# Patient Record
Sex: FEMALE | Race: WHITE | NOT HISPANIC OR LATINO
[De-identification: names, ages, dates, MRNs, and addresses within clinical notes are randomized per-mention and may not be internally consistent; named-entity substitution may affect disease eponyms.]

---

## 2017-02-28 ENCOUNTER — RX RENEWAL (OUTPATIENT)
Age: 37
End: 2017-02-28

## 2017-10-11 ENCOUNTER — LABORATORY RESULT (OUTPATIENT)
Age: 37
End: 2017-10-11

## 2017-10-11 ENCOUNTER — APPOINTMENT (OUTPATIENT)
Dept: INTERNAL MEDICINE | Facility: CLINIC | Age: 37
End: 2017-10-11
Payer: COMMERCIAL

## 2017-10-11 ENCOUNTER — NON-APPOINTMENT (OUTPATIENT)
Age: 37
End: 2017-10-11

## 2017-10-11 VITALS
TEMPERATURE: 98.2 F | RESPIRATION RATE: 18 BRPM | SYSTOLIC BLOOD PRESSURE: 130 MMHG | OXYGEN SATURATION: 99 % | DIASTOLIC BLOOD PRESSURE: 90 MMHG | BODY MASS INDEX: 29.19 KG/M2 | HEIGHT: 67 IN | HEART RATE: 88 BPM | WEIGHT: 186 LBS

## 2017-10-11 DIAGNOSIS — Z86.018 PERSONAL HISTORY OF OTHER BENIGN NEOPLASM: ICD-10-CM

## 2017-10-11 DIAGNOSIS — E83.52 HYPERCALCEMIA: ICD-10-CM

## 2017-10-11 DIAGNOSIS — D68.9 COAGULATION DEFECT, UNSPECIFIED: ICD-10-CM

## 2017-10-11 PROCEDURE — 93000 ELECTROCARDIOGRAM COMPLETE: CPT

## 2017-10-11 PROCEDURE — 99204 OFFICE O/P NEW MOD 45 MIN: CPT | Mod: 25

## 2017-10-12 PROBLEM — E83.52 HYPERCALCEMIA: Status: ACTIVE | Noted: 2017-10-12

## 2017-10-12 LAB
25(OH)D3 SERPL-MCNC: 20.5 NG/ML
ALBUMIN SERPL ELPH-MCNC: 4.3 G/DL
ALP BLD-CCNC: 64 U/L
ALT SERPL-CCNC: 53 U/L
ANION GAP SERPL CALC-SCNC: 14 MMOL/L
APPEARANCE: CLEAR
AST SERPL-CCNC: 42 U/L
BASOPHILS # BLD AUTO: 0.01 K/UL
BASOPHILS NFR BLD AUTO: 0.2 %
BILIRUB SERPL-MCNC: 0.2 MG/DL
BILIRUBIN URINE: NEGATIVE
BLOOD URINE: NEGATIVE
BUN SERPL-MCNC: 13 MG/DL
CALCIUM SERPL-MCNC: 10.9 MG/DL
CCP AB SER IA-ACNC: <8 UNITS
CHLORIDE SERPL-SCNC: 100 MMOL/L
CHOLEST SERPL-MCNC: 157 MG/DL
CHOLEST/HDLC SERPL: 2.8 RATIO
CO2 SERPL-SCNC: 23 MMOL/L
COLOR: YELLOW
CREAT SERPL-MCNC: 0.78 MG/DL
EOSINOPHIL # BLD AUTO: 0.05 K/UL
EOSINOPHIL NFR BLD AUTO: 0.9 %
GGT SERPL-CCNC: 32 U/L
GLUCOSE QUALITATIVE U: 1000 MG/DL
GLUCOSE SERPL-MCNC: 242 MG/DL
HBA1C MFR BLD HPLC: 8.6 %
HCG SERPL-MCNC: <1 MIU/ML
HCT VFR BLD CALC: 36.6 %
HDLC SERPL-MCNC: 56 MG/DL
HGB BLD-MCNC: 11 G/DL
IMM GRANULOCYTES NFR BLD AUTO: 0 %
INSULIN SERPL-MCNC: 18 UU/ML
KETONES URINE: NEGATIVE
LDLC SERPL CALC-MCNC: 68 MG/DL
LEUKOCYTE ESTERASE URINE: NEGATIVE
LYMPHOCYTES # BLD AUTO: 2.16 K/UL
LYMPHOCYTES NFR BLD AUTO: 38.3 %
MAN DIFF?: NORMAL
MCHC RBC-ENTMCNC: 24.1 PG
MCHC RBC-ENTMCNC: 30.1 GM/DL
MCV RBC AUTO: 80.3 FL
MONOCYTES # BLD AUTO: 0.38 K/UL
MONOCYTES NFR BLD AUTO: 6.7 %
NEUTROPHILS # BLD AUTO: 3.04 K/UL
NEUTROPHILS NFR BLD AUTO: 53.9 %
NITRITE URINE: NEGATIVE
PH URINE: 5
PLATELET # BLD AUTO: 560 K/UL
POTASSIUM SERPL-SCNC: 5 MMOL/L
PROT SERPL-MCNC: 8 G/DL
PROTEIN URINE: ABNORMAL MG/DL
RBC # BLD: 4.56 M/UL
RBC # FLD: 20 %
RF+CCP IGG SER-IMP: NEGATIVE
SODIUM SERPL-SCNC: 137 MMOL/L
SPECIFIC GRAVITY URINE: 1.03
TRIGL SERPL-MCNC: 163 MG/DL
TSH SERPL-ACNC: 1.84 UIU/ML
UROBILINOGEN URINE: NEGATIVE MG/DL
WBC # FLD AUTO: 5.64 K/UL

## 2017-10-17 LAB — GAD65 AB SER-MCNC: 0.02 NMOL/L

## 2018-03-26 ENCOUNTER — EMERGENCY (EMERGENCY)
Facility: HOSPITAL | Age: 38
LOS: 0 days | Discharge: HOME | End: 2018-03-26
Attending: EMERGENCY MEDICINE
Payer: MEDICARE

## 2018-03-26 VITALS
SYSTOLIC BLOOD PRESSURE: 128 MMHG | HEART RATE: 90 BPM | OXYGEN SATURATION: 99 % | DIASTOLIC BLOOD PRESSURE: 76 MMHG | TEMPERATURE: 97 F | RESPIRATION RATE: 18 BRPM

## 2018-03-26 VITALS
HEIGHT: 64 IN | DIASTOLIC BLOOD PRESSURE: 91 MMHG | HEART RATE: 102 BPM | WEIGHT: 186.07 LBS | SYSTOLIC BLOOD PRESSURE: 143 MMHG | OXYGEN SATURATION: 98 % | TEMPERATURE: 98 F | RESPIRATION RATE: 16 BRPM

## 2018-03-26 DIAGNOSIS — Z79.4 LONG TERM (CURRENT) USE OF INSULIN: ICD-10-CM

## 2018-03-26 DIAGNOSIS — D68.59 OTHER PRIMARY THROMBOPHILIA: ICD-10-CM

## 2018-03-26 DIAGNOSIS — M79.662 PAIN IN LEFT LOWER LEG: ICD-10-CM

## 2018-03-26 DIAGNOSIS — E10.9 TYPE 1 DIABETES MELLITUS WITHOUT COMPLICATIONS: ICD-10-CM

## 2018-03-26 DIAGNOSIS — M54.2 CERVICALGIA: ICD-10-CM

## 2018-03-26 PROCEDURE — 93971 EXTREMITY STUDY: CPT | Mod: 26,LT

## 2018-03-26 PROCEDURE — 93971 EXTREMITY STUDY: CPT | Mod: 26,59,LT

## 2018-03-26 PROCEDURE — 99284 EMERGENCY DEPT VISIT MOD MDM: CPT

## 2018-03-26 RX ORDER — IBUPROFEN 200 MG
600 TABLET ORAL ONCE
Qty: 0 | Refills: 0 | Status: COMPLETED | OUTPATIENT
Start: 2018-03-26 | End: 2018-03-26

## 2018-03-26 RX ADMIN — Medication 600 MILLIGRAM(S): at 13:58

## 2018-03-26 RX ADMIN — Medication 600 MILLIGRAM(S): at 13:32

## 2018-03-26 NOTE — ED PROVIDER NOTE - PHYSICAL EXAMINATION
Gen: No acute distress, non toxic  HEENT: Mucous membranes moist, pink conjunctivae, EOMI. no facial swelling  Neck: can range, mild ttp over left lateral neck, near external jugular, no erythema/warmth/swelling.   CV: RRR, nl s1/s2.  Resp: CTAB, normal rate and effort  GI: Abdomen soft, NT, ND. No rebound, no guarding  : No CVAT  Neuro: A&O x 3, moving all 4 extremities  MSK: No spine or joint tenderness to palpation. no swelling b/l LE. mild ttp proximal left lower calf/upper ankle. no overlying skin changes.   Skin: No rashes. intact and perfused.

## 2018-03-26 NOTE — ED PROVIDER NOTE - OBJECTIVE STATEMENT
36 y/o female hx IDDM, protein c/s deficiency with hx of thrombophlebitis but no dvt in past c/o 2 days of pain in left neck over "vein" and pain in LLE just proximal to ankle, both atraumatic. Taking aspirin past two days with minimal improvement. Pt concerned for clot given it hurts near vein. No overlying skin changes, no fever, headache, neuro symptoms, swelling in arm/face, cp, sob, abd pain, ocps, or smoking.     ROS: No fever/chills. No eye pain/changes in vision, No ear pain/sore throat/dysphagia, No chest pain/palpitations. No SOB/cough/. No abdominal pain, N/V/D, no black/bloody bm. No dysuria/frequency/discharge, No headache. No Dizziness.    No rashes or breaks in skin. No numbness/tingling/weakness. 38 y/o female hx IDDM, protein c/s deficiency with hx of thrombophlebitis but no dvt in past c/o 2 days of pain in left neck over "vein" and pain in LLE just proximal to ankle, both atraumatic. Taking aspirin past two days with minimal improvement. Pt concerned for clot given it hurts near vein. No overlying skin changes, no fever, headache, neuro symptoms, swelling in arm/face, cp, sob, abd pain, ocps, or smoking. States not pregnant    ROS: No fever/chills. No eye pain/changes in vision, No ear pain/sore throat/dysphagia, No chest pain/palpitations. No SOB/cough/. No abdominal pain, N/V/D, no black/bloody bm. No dysuria/frequency/discharge, No headache. No Dizziness.    No rashes or breaks in skin. No numbness/tingling/weakness.

## 2018-03-26 NOTE — ED PROVIDER NOTE - PROGRESS NOTE DETAILS
[place]: results given and explained to pt including lymph node, indeterminant cause, possible reactive but will f/u with pcp. warm compresses, no erythema/warmth over superficial veins but slightly hard/distension of external jugular, so will recommend warm compress which will help any thrombophlebitis and also works for muscle strain.

## 2018-03-26 NOTE — ED ADULT NURSE NOTE - OBJECTIVE STATEMENT
Patient states for a few days now she has had a bulge forming on the left side of her neck, states that she has pain when she moves her neck, especially to the left side. States that her left ankle is also hurting, unsure if that is related. Patient is Diabetic, has history of uterine fibroids, Clotting disorder, patient states "I cannot remember which one but it is either C or S protein deficiency". Denies trauma to the area, thinks she may have slept poorly on the area. Takes aspirin for pain.

## 2018-12-19 ENCOUNTER — MEDICATION RENEWAL (OUTPATIENT)
Age: 38
End: 2018-12-19

## 2018-12-20 PROBLEM — D68.9 COAGULATION DEFECT, UNSPECIFIED: Chronic | Status: ACTIVE | Noted: 2018-03-26

## 2018-12-20 PROBLEM — D25.9 LEIOMYOMA OF UTERUS, UNSPECIFIED: Chronic | Status: ACTIVE | Noted: 2018-03-26

## 2019-01-31 ENCOUNTER — LABORATORY RESULT (OUTPATIENT)
Age: 39
End: 2019-01-31

## 2019-01-31 ENCOUNTER — NON-APPOINTMENT (OUTPATIENT)
Age: 39
End: 2019-01-31

## 2019-01-31 ENCOUNTER — APPOINTMENT (OUTPATIENT)
Dept: PULMONOLOGY | Facility: CLINIC | Age: 39
End: 2019-01-31
Payer: COMMERCIAL

## 2019-01-31 VITALS
TEMPERATURE: 98.5 F | OXYGEN SATURATION: 97 % | DIASTOLIC BLOOD PRESSURE: 90 MMHG | HEART RATE: 92 BPM | BODY MASS INDEX: 28.25 KG/M2 | HEIGHT: 67 IN | SYSTOLIC BLOOD PRESSURE: 130 MMHG | WEIGHT: 180 LBS

## 2019-01-31 DIAGNOSIS — Z01.818 ENCOUNTER FOR OTHER PREPROCEDURAL EXAMINATION: ICD-10-CM

## 2019-01-31 PROCEDURE — 93000 ELECTROCARDIOGRAM COMPLETE: CPT

## 2019-01-31 PROCEDURE — 99204 OFFICE O/P NEW MOD 45 MIN: CPT | Mod: 25

## 2019-01-31 PROCEDURE — 36415 COLL VENOUS BLD VENIPUNCTURE: CPT

## 2019-02-03 NOTE — ASSESSMENT
[Patient Optimized for Surgery] : Patient optimized for surgery [No Further Testing Recommended] : no further testing recommended [ECG] : ECG [FreeTextEntry4] : No contraindication for surgery. The medical condition is optimized for surgery. \par \par DARIA CORTEZ is to be extubated once fully awake and able to protect airway. she is to be monitored in the recovery room.  Avoid oversedation. DVT prophylaxis is recommended.\par \par  I instructed the patient to notify me if there is any change in the clinical status prior the surgery including any skin manifestations. No aspirin or NSAIDs, Naproxen, FRAUSTO inhibitors, herbs, green tea and vitamins prior to surgery. NPO after midnight prior to surg. \par \par - EKG NSR\par - Follow on lab work\par \par HTN\par \par BP controlled.  Pt currently off antihypertensives \par \par DM II\par \par Hold metformin night prior and morning of surg.  Lantus 1/2 dosage the night prior and hold insulin the morning of surg due to NPO status\par \par Iron deficiency anemia\par \par Continue iron supplements.  follow on CBC \par

## 2019-02-03 NOTE — RESULTS/DATA
[] : results reviewed [ECG Reviewed] : reviewed [Normal] : The 12 - lead ECG is normal [NSR] : normal sinus rhythm [de-identified] : pending [de-identified] : pending [de-identified] : pending

## 2019-02-04 ENCOUNTER — TRANSCRIPTION ENCOUNTER (OUTPATIENT)
Age: 39
End: 2019-02-04

## 2019-02-04 ENCOUNTER — RESULT REVIEW (OUTPATIENT)
Age: 39
End: 2019-02-04

## 2019-02-04 LAB
ABO + RH PNL BLD: NORMAL
ALBUMIN SERPL ELPH-MCNC: 4.9 G/DL
ALP BLD-CCNC: 49 U/L
ALT SERPL-CCNC: 20 U/L
ANION GAP SERPL CALC-SCNC: 13 MMOL/L
APPEARANCE: CLEAR
APTT BLD: 31 SEC
AST SERPL-CCNC: 28 U/L
BASOPHILS # BLD AUTO: 0.02 K/UL
BASOPHILS NFR BLD AUTO: 0.5 %
BILIRUB SERPL-MCNC: 0.2 MG/DL
BILIRUBIN URINE: NEGATIVE
BLOOD URINE: ABNORMAL
BUN SERPL-MCNC: 12 MG/DL
CALCIUM SERPL-MCNC: 10.7 MG/DL
CANCER AG125 SERPL-ACNC: 78 U/ML
CHLORIDE SERPL-SCNC: 105 MMOL/L
CO2 SERPL-SCNC: 22 MMOL/L
COLOR: YELLOW
CREAT SERPL-MCNC: 0.75 MG/DL
EOSINOPHIL # BLD AUTO: 0.07 K/UL
EOSINOPHIL NFR BLD AUTO: 1.6 %
GLUCOSE QUALITATIVE U: NEGATIVE MG/DL
GLUCOSE SERPL-MCNC: 97 MG/DL
HBA1C MFR BLD HPLC: 6 %
HCG SERPL-MCNC: <1 MIU/ML
HCT VFR BLD CALC: 41.5 %
HGB BLD-MCNC: 13.1 G/DL
IMM GRANULOCYTES NFR BLD AUTO: 0.2 %
INR PPP: 1.04 RATIO
KETONES URINE: NEGATIVE
LEUKOCYTE ESTERASE URINE: NEGATIVE
LYMPHOCYTES # BLD AUTO: 1.65 K/UL
LYMPHOCYTES NFR BLD AUTO: 37.2 %
MAN DIFF?: NORMAL
MCHC RBC-ENTMCNC: 29.6 PG
MCHC RBC-ENTMCNC: 31.6 GM/DL
MCV RBC AUTO: 93.7 FL
MONOCYTES # BLD AUTO: 0.31 K/UL
MONOCYTES NFR BLD AUTO: 7 %
NEUTROPHILS # BLD AUTO: 2.38 K/UL
NEUTROPHILS NFR BLD AUTO: 53.5 %
NITRITE URINE: NEGATIVE
PH URINE: 5
PLATELET # BLD AUTO: 352 K/UL
POTASSIUM SERPL-SCNC: 4.6 MMOL/L
PROT SERPL-MCNC: 8 G/DL
PROTEIN URINE: NEGATIVE MG/DL
PT BLD: 11.9 SEC
RBC # BLD: 4.43 M/UL
RBC # FLD: 18.6 %
SODIUM SERPL-SCNC: 140 MMOL/L
SPECIFIC GRAVITY URINE: 1.01
UROBILINOGEN URINE: NEGATIVE MG/DL
WBC # FLD AUTO: 4.44 K/UL

## 2019-02-11 ENCOUNTER — TRANSCRIPTION ENCOUNTER (OUTPATIENT)
Age: 39
End: 2019-02-11

## 2019-02-11 LAB — ANTI-MUELLERIAN HORMONE: 1.97 NG/ML

## 2019-02-12 ENCOUNTER — TRANSCRIPTION ENCOUNTER (OUTPATIENT)
Age: 39
End: 2019-02-12

## 2019-02-20 VITALS
OXYGEN SATURATION: 100 % | WEIGHT: 179.02 LBS | DIASTOLIC BLOOD PRESSURE: 83 MMHG | HEART RATE: 100 BPM | SYSTOLIC BLOOD PRESSURE: 128 MMHG | TEMPERATURE: 98 F | HEIGHT: 64 IN | RESPIRATION RATE: 18 BRPM

## 2019-02-20 NOTE — ASU PATIENT PROFILE, ADULT - PMH
Anemia    Clotting disorder    Diabetes  type 2  Fibroids    HTN (hypertension)  off lisinopril  Hypercalcemia Anemia    Clotting disorder    Diabetes  type 2  Fibroids    Hypercalcemia

## 2019-02-20 NOTE — ASU PREOP CHECKLIST - SELECT TESTS ORDERED
BMP/PT/PTT/INR/EKG/Urinalysis/CBC Urinalysis/PT/PTT/EKG/ucg/CBC/INR/BMP BMP/Urinalysis/EKG/CBC/ucg negative/PT/PTT/INR

## 2019-02-21 ENCOUNTER — INPATIENT (INPATIENT)
Facility: HOSPITAL | Age: 39
LOS: 1 days | Discharge: HOME | DRG: 743 | End: 2019-02-23
Attending: OBSTETRICS & GYNECOLOGY | Admitting: OBSTETRICS & GYNECOLOGY
Payer: COMMERCIAL

## 2019-02-21 ENCOUNTER — RESULT REVIEW (OUTPATIENT)
Age: 39
End: 2019-02-21

## 2019-02-21 LAB
GLUCOSE BLDC GLUCOMTR-MCNC: 129 MG/DL — HIGH (ref 70–99)
GLUCOSE BLDC GLUCOMTR-MCNC: 153 MG/DL — HIGH (ref 70–99)
GLUCOSE BLDC GLUCOMTR-MCNC: 176 MG/DL — HIGH (ref 70–99)
GLUCOSE BLDC GLUCOMTR-MCNC: 192 MG/DL — HIGH (ref 70–99)

## 2019-02-21 RX ORDER — INSULIN GLARGINE 100 [IU]/ML
15 INJECTION, SOLUTION SUBCUTANEOUS AT BEDTIME
Qty: 0 | Refills: 0 | Status: DISCONTINUED | OUTPATIENT
Start: 2019-02-21 | End: 2019-02-23

## 2019-02-21 RX ORDER — METOCLOPRAMIDE HCL 10 MG
10 TABLET ORAL EVERY 6 HOURS
Qty: 0 | Refills: 0 | Status: DISCONTINUED | OUTPATIENT
Start: 2019-02-21 | End: 2019-02-23

## 2019-02-21 RX ORDER — ACETAMINOPHEN 500 MG
975 TABLET ORAL EVERY 8 HOURS
Qty: 0 | Refills: 0 | Status: DISCONTINUED | OUTPATIENT
Start: 2019-02-21 | End: 2019-02-23

## 2019-02-21 RX ORDER — SIMETHICONE 80 MG/1
80 TABLET, CHEWABLE ORAL EVERY 8 HOURS
Qty: 0 | Refills: 0 | Status: DISCONTINUED | OUTPATIENT
Start: 2019-02-21 | End: 2019-02-23

## 2019-02-21 RX ORDER — GLUCAGON INJECTION, SOLUTION 0.5 MG/.1ML
1 INJECTION, SOLUTION SUBCUTANEOUS ONCE
Qty: 0 | Refills: 0 | Status: DISCONTINUED | OUTPATIENT
Start: 2019-02-21 | End: 2019-02-23

## 2019-02-21 RX ORDER — OXYCODONE HYDROCHLORIDE 5 MG/1
5 TABLET ORAL EVERY 6 HOURS
Qty: 0 | Refills: 0 | Status: DISCONTINUED | OUTPATIENT
Start: 2019-02-21 | End: 2019-02-23

## 2019-02-21 RX ORDER — HYDROMORPHONE HYDROCHLORIDE 2 MG/ML
0.5 INJECTION INTRAMUSCULAR; INTRAVENOUS; SUBCUTANEOUS
Qty: 0 | Refills: 0 | Status: DISCONTINUED | OUTPATIENT
Start: 2019-02-21 | End: 2019-02-21

## 2019-02-21 RX ORDER — ONDANSETRON 8 MG/1
4 TABLET, FILM COATED ORAL EVERY 6 HOURS
Qty: 0 | Refills: 0 | Status: DISCONTINUED | OUTPATIENT
Start: 2019-02-21 | End: 2019-02-23

## 2019-02-21 RX ORDER — SODIUM CHLORIDE 9 MG/ML
1000 INJECTION, SOLUTION INTRAVENOUS
Qty: 0 | Refills: 0 | Status: DISCONTINUED | OUTPATIENT
Start: 2019-02-21 | End: 2019-02-23

## 2019-02-21 RX ORDER — DEXTROSE 50 % IN WATER 50 %
15 SYRINGE (ML) INTRAVENOUS ONCE
Qty: 0 | Refills: 0 | Status: DISCONTINUED | OUTPATIENT
Start: 2019-02-21 | End: 2019-02-23

## 2019-02-21 RX ORDER — DEXTROSE 50 % IN WATER 50 %
12.5 SYRINGE (ML) INTRAVENOUS ONCE
Qty: 0 | Refills: 0 | Status: DISCONTINUED | OUTPATIENT
Start: 2019-02-21 | End: 2019-02-23

## 2019-02-21 RX ORDER — INSULIN LISPRO 100/ML
VIAL (ML) SUBCUTANEOUS
Qty: 0 | Refills: 0 | Status: DISCONTINUED | OUTPATIENT
Start: 2019-02-21 | End: 2019-02-23

## 2019-02-21 RX ORDER — DOCUSATE SODIUM 100 MG
100 CAPSULE ORAL THREE TIMES A DAY
Qty: 0 | Refills: 0 | Status: DISCONTINUED | OUTPATIENT
Start: 2019-02-21 | End: 2019-02-23

## 2019-02-21 RX ORDER — DEXTROSE 50 % IN WATER 50 %
25 SYRINGE (ML) INTRAVENOUS ONCE
Qty: 0 | Refills: 0 | Status: DISCONTINUED | OUTPATIENT
Start: 2019-02-21 | End: 2019-02-23

## 2019-02-21 RX ORDER — BUPIVACAINE 13.3 MG/ML
20 INJECTION, SUSPENSION, LIPOSOMAL INFILTRATION ONCE
Qty: 0 | Refills: 0 | Status: DISCONTINUED | OUTPATIENT
Start: 2019-02-21 | End: 2019-02-23

## 2019-02-21 RX ORDER — KETOROLAC TROMETHAMINE 30 MG/ML
30 SYRINGE (ML) INJECTION EVERY 6 HOURS
Qty: 0 | Refills: 0 | Status: DISCONTINUED | OUTPATIENT
Start: 2019-02-21 | End: 2019-02-22

## 2019-02-21 RX ORDER — HYDROMORPHONE HYDROCHLORIDE 2 MG/ML
0.2 INJECTION INTRAMUSCULAR; INTRAVENOUS; SUBCUTANEOUS EVERY 4 HOURS
Qty: 0 | Refills: 0 | Status: DISCONTINUED | OUTPATIENT
Start: 2019-02-21 | End: 2019-02-22

## 2019-02-21 RX ORDER — SODIUM CHLORIDE 9 MG/ML
1000 INJECTION, SOLUTION INTRAVENOUS
Qty: 0 | Refills: 0 | Status: DISCONTINUED | OUTPATIENT
Start: 2019-02-21 | End: 2019-02-22

## 2019-02-21 RX ORDER — OXYCODONE HYDROCHLORIDE 5 MG/1
10 TABLET ORAL EVERY 6 HOURS
Qty: 0 | Refills: 0 | Status: DISCONTINUED | OUTPATIENT
Start: 2019-02-21 | End: 2019-02-23

## 2019-02-21 RX ORDER — HEPARIN SODIUM 5000 [USP'U]/ML
5000 INJECTION INTRAVENOUS; SUBCUTANEOUS EVERY 12 HOURS
Qty: 0 | Refills: 0 | Status: DISCONTINUED | OUTPATIENT
Start: 2019-02-21 | End: 2019-02-23

## 2019-02-21 RX ADMIN — INSULIN GLARGINE 15 UNIT(S): 100 INJECTION, SOLUTION SUBCUTANEOUS at 22:10

## 2019-02-21 RX ADMIN — Medication 30 MILLIGRAM(S): at 17:45

## 2019-02-21 RX ADMIN — HYDROMORPHONE HYDROCHLORIDE 0.5 MILLIGRAM(S): 2 INJECTION INTRAMUSCULAR; INTRAVENOUS; SUBCUTANEOUS at 10:43

## 2019-02-21 RX ADMIN — HEPARIN SODIUM 5000 UNIT(S): 5000 INJECTION INTRAVENOUS; SUBCUTANEOUS at 22:01

## 2019-02-21 RX ADMIN — Medication 30 MILLIGRAM(S): at 22:00

## 2019-02-21 RX ADMIN — OXYCODONE HYDROCHLORIDE 5 MILLIGRAM(S): 5 TABLET ORAL at 13:13

## 2019-02-21 RX ADMIN — Medication 2: at 11:07

## 2019-02-21 RX ADMIN — SIMETHICONE 80 MILLIGRAM(S): 80 TABLET, CHEWABLE ORAL at 22:00

## 2019-02-21 RX ADMIN — Medication 2: at 16:58

## 2019-02-21 RX ADMIN — Medication 30 MILLIGRAM(S): at 23:00

## 2019-02-21 RX ADMIN — Medication 975 MILLIGRAM(S): at 16:58

## 2019-02-21 RX ADMIN — OXYCODONE HYDROCHLORIDE 5 MILLIGRAM(S): 5 TABLET ORAL at 13:39

## 2019-02-21 RX ADMIN — HYDROMORPHONE HYDROCHLORIDE 0.5 MILLIGRAM(S): 2 INJECTION INTRAMUSCULAR; INTRAVENOUS; SUBCUTANEOUS at 10:58

## 2019-02-21 RX ADMIN — Medication 975 MILLIGRAM(S): at 17:45

## 2019-02-21 RX ADMIN — HYDROMORPHONE HYDROCHLORIDE 0.5 MILLIGRAM(S): 2 INJECTION INTRAMUSCULAR; INTRAVENOUS; SUBCUTANEOUS at 11:34

## 2019-02-21 RX ADMIN — SIMETHICONE 80 MILLIGRAM(S): 80 TABLET, CHEWABLE ORAL at 14:11

## 2019-02-21 RX ADMIN — Medication 30 MILLIGRAM(S): at 16:58

## 2019-02-21 RX ADMIN — Medication 2: at 22:09

## 2019-02-21 RX ADMIN — HYDROMORPHONE HYDROCHLORIDE 0.5 MILLIGRAM(S): 2 INJECTION INTRAMUSCULAR; INTRAVENOUS; SUBCUTANEOUS at 12:30

## 2019-02-21 NOTE — BRIEF OPERATIVE NOTE - PROCEDURE
<<-----Click on this checkbox to enter Procedure Bilateral salpingectomy by abdominal approach  02/21/2019    Active  HGOLD2  Supracervical hysterectomy  02/21/2019    Active  HGOLD2

## 2019-02-21 NOTE — PROGRESS NOTE ADULT - SUBJECTIVE AND OBJECTIVE BOX
Patient seen at bedside for post op check. Pain is well controlled. Patient is voiding and tolerating clears. She is not yet ambulating and is wearing  SCDs. Has not yet passed flatus. Tolerating clears. Denies headache, dizziness, nausea/vomiting, shortness of breath, palpitations, heavy bleeding.     T(C): 36.4 (02-21-19 @ 10:29), Max: 36.4 (02-21-19 @ 10:29)  HR: 102 (02-21-19 @ 14:29) (90 - 102)  BP: 154/89 (02-21-19 @ 14:29) (120/69 - 154/89)  RR: 14 (02-21-19 @ 14:29) (14 - 24)  SpO2: 99% (02-21-19 @ 14:29) (98% - 100%)    Gen: NAD, AO x3  Chest: normal work of breathing  Abdomen: soft, nontender, nondistended, no rebound or guarding  Incision: dressing clean, dry, intact  : no bleeding  Extremities: SCDs on      02-21-19 @ 07:01  -  02-21-19 @ 14:46  --------------------------------------------------------  IN: 4175 mL / OUT: 150 mL / NET: 4025 mL

## 2019-02-21 NOTE — H&P ADULT - ASSESSMENT
37 yo G0 here for open SHI, BS for fibroid uterus.  -2 u prbc on hold  -npo  -to pacu after  -ISS, diabetic diet post op  -analgesia prn

## 2019-02-21 NOTE — PROGRESS NOTE ADULT - ASSESSMENT
38y Female POD#0 s/p open SHI, BS for enlarged fibroid uterus.    1. Neuro/Pain:  Toradol and Tylenol ATC, oxycodone prn, dilaudid prn BT  2  CV:   VS per routine  3. Pulm: Encourage ISS  4. GI: Diabetic clears  5. : Voiding  6. Heme: AM CBC  7. Endo: DMII, Lantus 15 qHS, ISS  8. DVT ppx: SCDs, SQH  9. Dispo: Likely POD#1 or 2

## 2019-02-21 NOTE — H&P ADULT - NSHPPHYSICALEXAM_GEN_ALL_CORE
Vital Signs Last 24 Hrs  T(C): 36.5 (21 Feb 2019 15:37), Max: 36.5 (21 Feb 2019 15:37)  T(F): 97.7 (21 Feb 2019 15:37), Max: 97.7 (21 Feb 2019 15:37)  HR: 96 (21 Feb 2019 16:21) (90 - 102)  BP: 156/88 (21 Feb 2019 16:21) (120/69 - 156/88)  BP(mean): 111 (21 Feb 2019 14:29) (88 - 112)  RR: 16 (21 Feb 2019 16:21) (14 - 24)  SpO2: 100% (21 Feb 2019 16:21) (98% - 100%)    Gen NAD, AOx3  Chest normal work of breathing  Abdomen: appears gravid  Extremities: no edema, nontender

## 2019-02-21 NOTE — H&P ADULT - HISTORY OF PRESENT ILLNESS
39 yo G0 presents for scheduled hysterectomy for 40 wk sized fibroid uterus. Patient reports pelvic pain and discomfort w/ alternating diarrhea and constipation, as well as stress urinary incontinence. Not sexually active. Regular menses, heavy bleeding w/ anemia 2/2 menorrhagia.   Gynhx: fibroids  PMH: DMII well controlled, anemia  PSH: denies  Meds: Lantus 15-20 qHS, Lispro 6-8 u prn w/ meals, Metformin 1000 mg qd  NKDA  Social: tobacco, alcohol, drugs

## 2019-02-22 LAB
EXTRA GREEN TOP TUBE: SIGNIFICANT CHANGE UP
GLUCOSE BLDC GLUCOMTR-MCNC: 110 MG/DL — HIGH (ref 70–99)
GLUCOSE BLDC GLUCOMTR-MCNC: 111 MG/DL — HIGH (ref 70–99)
GLUCOSE BLDC GLUCOMTR-MCNC: 87 MG/DL — SIGNIFICANT CHANGE UP (ref 70–99)
GLUCOSE BLDC GLUCOMTR-MCNC: 96 MG/DL — SIGNIFICANT CHANGE UP (ref 70–99)
HCT VFR BLD CALC: 35.4 % — SIGNIFICANT CHANGE UP (ref 34.5–45)
HGB BLD-MCNC: 11.8 G/DL — SIGNIFICANT CHANGE UP (ref 11.5–15.5)
MCHC RBC-ENTMCNC: 31.6 PG — SIGNIFICANT CHANGE UP (ref 27–34)
MCHC RBC-ENTMCNC: 33.3 GM/DL — SIGNIFICANT CHANGE UP (ref 32–36)
MCV RBC AUTO: 94.7 FL — SIGNIFICANT CHANGE UP (ref 80–100)
NRBC # BLD: 0 /100 WBCS — SIGNIFICANT CHANGE UP (ref 0–0)
PLATELET # BLD AUTO: 294 K/UL — SIGNIFICANT CHANGE UP (ref 150–400)
RBC # BLD: 3.74 M/UL — LOW (ref 3.8–5.2)
RBC # FLD: 15.8 % — HIGH (ref 10.3–14.5)
WBC # BLD: 11.69 K/UL — HIGH (ref 3.8–10.5)
WBC # FLD AUTO: 11.69 K/UL — HIGH (ref 3.8–10.5)

## 2019-02-22 RX ORDER — IBUPROFEN 200 MG
600 TABLET ORAL EVERY 6 HOURS
Qty: 0 | Refills: 0 | Status: DISCONTINUED | OUTPATIENT
Start: 2019-02-22 | End: 2019-02-23

## 2019-02-22 RX ORDER — ZINC OXIDE 200 MG/G
1 OINTMENT TOPICAL
Qty: 0 | Refills: 0 | Status: DISCONTINUED | OUTPATIENT
Start: 2019-02-22 | End: 2019-02-23

## 2019-02-22 RX ADMIN — Medication 30 MILLIGRAM(S): at 06:00

## 2019-02-22 RX ADMIN — INSULIN GLARGINE 15 UNIT(S): 100 INJECTION, SOLUTION SUBCUTANEOUS at 21:50

## 2019-02-22 RX ADMIN — Medication 600 MILLIGRAM(S): at 23:18

## 2019-02-22 RX ADMIN — ZINC OXIDE 1 APPLICATION(S): 200 OINTMENT TOPICAL at 19:19

## 2019-02-22 RX ADMIN — SIMETHICONE 80 MILLIGRAM(S): 80 TABLET, CHEWABLE ORAL at 17:50

## 2019-02-22 RX ADMIN — Medication 975 MILLIGRAM(S): at 10:20

## 2019-02-22 RX ADMIN — SIMETHICONE 80 MILLIGRAM(S): 80 TABLET, CHEWABLE ORAL at 05:03

## 2019-02-22 RX ADMIN — Medication 30 MILLIGRAM(S): at 05:03

## 2019-02-22 RX ADMIN — Medication 30 MILLIGRAM(S): at 10:50

## 2019-02-22 RX ADMIN — Medication 30 MILLIGRAM(S): at 17:44

## 2019-02-22 RX ADMIN — SIMETHICONE 80 MILLIGRAM(S): 80 TABLET, CHEWABLE ORAL at 21:50

## 2019-02-22 RX ADMIN — Medication 975 MILLIGRAM(S): at 01:23

## 2019-02-22 RX ADMIN — Medication 30 MILLIGRAM(S): at 16:37

## 2019-02-22 RX ADMIN — Medication 975 MILLIGRAM(S): at 17:49

## 2019-02-22 RX ADMIN — Medication 975 MILLIGRAM(S): at 18:40

## 2019-02-22 RX ADMIN — Medication 600 MILLIGRAM(S): at 21:50

## 2019-02-22 RX ADMIN — HEPARIN SODIUM 5000 UNIT(S): 5000 INJECTION INTRAVENOUS; SUBCUTANEOUS at 10:20

## 2019-02-22 RX ADMIN — Medication 975 MILLIGRAM(S): at 11:20

## 2019-02-22 RX ADMIN — Medication 30 MILLIGRAM(S): at 10:19

## 2019-02-22 RX ADMIN — Medication 975 MILLIGRAM(S): at 02:23

## 2019-02-22 RX ADMIN — HEPARIN SODIUM 5000 UNIT(S): 5000 INJECTION INTRAVENOUS; SUBCUTANEOUS at 21:50

## 2019-02-22 NOTE — PROGRESS NOTE ADULT - ASSESSMENT
37yo POD0 s/p open supracervical hysterectomy, bilateral salpingectomy for fibroid uterus. Pt is hemodynamically stable    Neuro: Pain- Toradol and Tylenol standing. Oxycodone as needed   Cardio: continue to monitor vitals per protocol   Pulm: incentive spirometer at least 10 times per hour while awake   GI: Reg diet, Zofran, Reglan, Simethicone, Colace    : voiding   DVT ppx: SQH BID   Activity- ambulate as tolerated

## 2019-02-22 NOTE — PROGRESS NOTE ADULT - ASSESSMENT
38y Female POD#1 s/p open SHI, BS for enlarged fibroid uterus, doing well.  1. Neuro/Pain:  Toradol and Tylenol ATC, oxycodone prn, dilaudid prn BT  2  CV:   VS per routine  3. Pulm: Encourage ISS  4. GI: Diabetic clears  5. : Voiding  6. Heme: stable  7. Endo: DMII, Lantus 15 qHS, ISS  8. DVT ppx: SCDs, SQH  9. Dispo: Likely POD#1 or 2

## 2019-02-22 NOTE — PROGRESS NOTE ADULT - SUBJECTIVE AND OBJECTIVE BOX
Patient seen at bedside. Pain is well controlled. Patient is voiding and ambulating. Has not yet passed flatus. Tolerating clears. Denies headache, dizziness, nausea/vomiting, shortness of breath, palpitations, heavy bleeding.     T(C): 36.8 (02-22-19 @ 05:00), Max: 37 (02-22-19 @ 00:00)  HR: 88 (02-22-19 @ 05:00) (86 - 102)  BP: 143/82 (02-22-19 @ 05:00) (120/69 - 157/88)  RR: 18 (02-22-19 @ 05:00) (14 - 24)  SpO2: 98% (02-22-19 @ 05:00) (98% - 100%)    Gen: NAD, AO x3  Chest: normal work of breathing  Abdomen: soft, nontender, moderately distended, no rebound or guarding  Incision: dressing c/d/i  Extremities: no calf tenderness or erythema      02-21-19 @ 07:01  -  02-22-19 @ 07:00  --------------------------------------------------------  IN: 5890 mL / OUT: 3350 mL / NET: 2540 mL                              11.8   11.69 )-----------( 294      ( 22 Feb 2019 05:52 )             35.4

## 2019-02-22 NOTE — PROGRESS NOTE ADULT - SUBJECTIVE AND OBJECTIVE BOX
Pt seen and examined at bedside. Pt states mild abdominal pain controlled with pain medication. Pt is ambulating, tolerating clear diet, and voiding.    Pt denies fever, chills, chest pain, SOB, nausea, vomiting, lightheadedness, dizziness.      T(F): 98.1 (02-22-19 @ 09:00), Max: 98.6 (02-22-19 @ 00:00)  HR: 83 (02-22-19 @ 09:00) (83 - 102)  BP: 145/89 (02-22-19 @ 09:00) (143/82 - 157/88)  RR: 17 (02-22-19 @ 09:00) (14 - 18)  SpO2: 99% (02-22-19 @ 09:00) (98% - 100%)  Wt(kg): --  I&O's Summary    21 Feb 2019 07:01  -  22 Feb 2019 07:00  --------------------------------------------------------  IN: 5890 mL / OUT: 3350 mL / NET: 2540 mL    22 Feb 2019 07:01  -  22 Feb 2019 13:07  --------------------------------------------------------  IN: 0 mL / OUT: 600 mL / NET: -600 mL    MEDICATIONS  (STANDING):  acetaminophen   Tablet .. 975 milliGRAM(s) Oral every 8 hours  BUpivacaine liposome 1.3% Injectable (no eMAR) 20 milliLiter(s) Local Injection Once  dextrose 5%. 1000 milliLiter(s) (50 mL/Hr) IV Continuous <Continuous>  dextrose 50% Injectable 12.5 Gram(s) IV Push once  dextrose 50% Injectable 25 Gram(s) IV Push once  dextrose 50% Injectable 25 Gram(s) IV Push once  heparin  Injectable 5000 Unit(s) SubCutaneous every 12 hours  insulin glargine Injectable (LANTUS) 15 Unit(s) SubCutaneous at bedtime  insulin lispro (HumaLOG) corrective regimen sliding scale   SubCutaneous Before meals and at bedtime  ketorolac   Injectable 30 milliGRAM(s) IV Push every 6 hours  simethicone 80 milliGRAM(s) Chew every 8 hours    MEDICATIONS  (PRN):  dextrose 40% Gel 15 Gram(s) Oral once PRN Blood Glucose LESS THAN 70 milliGRAM(s)/deciliter  docusate sodium 100 milliGRAM(s) Oral three times a day PRN Stool Softening  glucagon  Injectable 1 milliGRAM(s) IntraMuscular once PRN Glucose LESS THAN 70 milligrams/deciliter  metoclopramide Injectable 10 milliGRAM(s) IV Push every 6 hours PRN nausea  ondansetron Injectable 4 milliGRAM(s) IV Push every 6 hours PRN Nausea and/or Vomiting  oxyCODONE    IR 5 milliGRAM(s) Oral every 6 hours PRN Mild Pain (1 - 3)  oxyCODONE    IR 10 milliGRAM(s) Oral every 6 hours PRN Moderate Pain (4 - 6)    Physical Exam:  Constitutional: NAD  Pulmonary: clear to auscultation bilaterally   Cardiovascular: Regular rate and rhythm   Abdomen: incision site clean, dry, intact. Soft, mildly tender, mildly distended, no guarding, no rebound, +bowel sounds  Extremities: no lower extremity edema or calve tenderness. SCDs in place     LABS:                        11.8   11.69 )-----------( 294      ( 22 Feb 2019 05:52 )             35.4

## 2019-02-23 ENCOUNTER — TRANSCRIPTION ENCOUNTER (OUTPATIENT)
Age: 39
End: 2019-02-23

## 2019-02-23 VITALS
TEMPERATURE: 98 F | HEART RATE: 99 BPM | DIASTOLIC BLOOD PRESSURE: 100 MMHG | RESPIRATION RATE: 18 BRPM | OXYGEN SATURATION: 98 % | SYSTOLIC BLOOD PRESSURE: 158 MMHG

## 2019-02-23 LAB
GLUCOSE BLDC GLUCOMTR-MCNC: 100 MG/DL — HIGH (ref 70–99)
GLUCOSE BLDC GLUCOMTR-MCNC: 131 MG/DL — HIGH (ref 70–99)

## 2019-02-23 PROCEDURE — 36415 COLL VENOUS BLD VENIPUNCTURE: CPT

## 2019-02-23 PROCEDURE — 86850 RBC ANTIBODY SCREEN: CPT

## 2019-02-23 PROCEDURE — C9399: CPT

## 2019-02-23 PROCEDURE — 82962 GLUCOSE BLOOD TEST: CPT

## 2019-02-23 PROCEDURE — 86900 BLOOD TYPING SEROLOGIC ABO: CPT

## 2019-02-23 PROCEDURE — 86901 BLOOD TYPING SEROLOGIC RH(D): CPT

## 2019-02-23 PROCEDURE — 88307 TISSUE EXAM BY PATHOLOGIST: CPT

## 2019-02-23 PROCEDURE — 88304 TISSUE EXAM BY PATHOLOGIST: CPT

## 2019-02-23 PROCEDURE — 85027 COMPLETE CBC AUTOMATED: CPT

## 2019-02-23 RX ORDER — METFORMIN HYDROCHLORIDE 850 MG/1
1 TABLET ORAL
Qty: 30 | Refills: 1 | OUTPATIENT
Start: 2019-02-23 | End: 2019-04-23

## 2019-02-23 RX ORDER — ENOXAPARIN SODIUM 100 MG/ML
15 INJECTION SUBCUTANEOUS
Qty: 2 | Refills: 1 | OUTPATIENT
Start: 2019-02-23

## 2019-02-23 RX ORDER — INSULIN GLARGINE 100 [IU]/ML
15 INJECTION, SOLUTION SUBCUTANEOUS
Qty: 2 | Refills: 1 | OUTPATIENT
Start: 2019-02-23

## 2019-02-23 RX ADMIN — ZINC OXIDE 1 APPLICATION(S): 200 OINTMENT TOPICAL at 00:10

## 2019-02-23 RX ADMIN — Medication 975 MILLIGRAM(S): at 10:35

## 2019-02-23 RX ADMIN — HEPARIN SODIUM 5000 UNIT(S): 5000 INJECTION INTRAVENOUS; SUBCUTANEOUS at 10:35

## 2019-02-23 RX ADMIN — Medication 600 MILLIGRAM(S): at 05:01

## 2019-02-23 RX ADMIN — ZINC OXIDE 1 APPLICATION(S): 200 OINTMENT TOPICAL at 05:02

## 2019-02-23 RX ADMIN — Medication 600 MILLIGRAM(S): at 12:29

## 2019-02-23 RX ADMIN — OXYCODONE HYDROCHLORIDE 5 MILLIGRAM(S): 5 TABLET ORAL at 00:07

## 2019-02-23 RX ADMIN — OXYCODONE HYDROCHLORIDE 5 MILLIGRAM(S): 5 TABLET ORAL at 02:07

## 2019-02-23 RX ADMIN — SIMETHICONE 80 MILLIGRAM(S): 80 TABLET, CHEWABLE ORAL at 05:01

## 2019-02-23 RX ADMIN — Medication 600 MILLIGRAM(S): at 06:23

## 2019-02-23 RX ADMIN — Medication 600 MILLIGRAM(S): at 12:30

## 2019-02-23 NOTE — DISCHARGE NOTE ADULT - PATIENT PORTAL LINK FT
You can access the [facility] Patient Portal, offered by [facility], by registering with the following website: [email or web address]

## 2019-02-23 NOTE — DISCHARGE NOTE ADULT - HOSPITAL COURSE
Patient is status post an open SHI and BS for a 40 cm fibroid uterus. She had an uncomplicated postoperative course and has met her postoperative milestones appropriately.  Vitals are stable for discharge. She will follow up as an outpatient.

## 2019-02-23 NOTE — PROGRESS NOTE ADULT - SUBJECTIVE AND OBJECTIVE BOX
Pt seen and examined at bedside. Pt reports pan i well controlled.  She has been voiding, ambulating without difficuty, not yet passing gas.  She has been tolerating regular diet.  Pt denies fever, chills, chest pain, SOB, nausea, vomiting, lightheadedness, dizziness.      T(F): 98 (02-23-19 @ 04:59), Max: 98.8 (02-22-19 @ 14:40)  HR: 73 (02-23-19 @ 04:59) (73 - 92)  BP: 149/86 (02-23-19 @ 04:59) (142/81 - 154/89)  RR: 17 (02-23-19 @ 04:59) (16 - 17)  SpO2: 97% (02-23-19 @ 04:59) (97% - 100%)  Wt(kg): --  I&O's Summary    22 Feb 2019 07:01  -  23 Feb 2019 07:00  --------------------------------------------------------  IN: 0 mL / OUT: 3100 mL / NET: -3100 mL        MEDICATIONS  (STANDING):  acetaminophen   Tablet .. 975 milliGRAM(s) Oral every 8 hours  BUpivacaine liposome 1.3% Injectable (no eMAR) 20 milliLiter(s) Local Injection Once  dextrose 5%. 1000 milliLiter(s) (50 mL/Hr) IV Continuous <Continuous>  dextrose 50% Injectable 12.5 Gram(s) IV Push once  dextrose 50% Injectable 25 Gram(s) IV Push once  dextrose 50% Injectable 25 Gram(s) IV Push once  heparin  Injectable 5000 Unit(s) SubCutaneous every 12 hours  ibuprofen  Tablet. 600 milliGRAM(s) Oral every 6 hours  insulin glargine Injectable (LANTUS) 15 Unit(s) SubCutaneous at bedtime  insulin lispro (HumaLOG) corrective regimen sliding scale   SubCutaneous Before meals and at bedtime  simethicone 80 milliGRAM(s) Chew every 8 hours  zinc oxide 20% Ointment 1 Application(s) Topical four times a day    MEDICATIONS  (PRN):  dextrose 40% Gel 15 Gram(s) Oral once PRN Blood Glucose LESS THAN 70 milliGRAM(s)/deciliter  docusate sodium 100 milliGRAM(s) Oral three times a day PRN Stool Softening  glucagon  Injectable 1 milliGRAM(s) IntraMuscular once PRN Glucose LESS THAN 70 milligrams/deciliter  metoclopramide Injectable 10 milliGRAM(s) IV Push every 6 hours PRN nausea  ondansetron Injectable 4 milliGRAM(s) IV Push every 6 hours PRN Nausea and/or Vomiting  oxyCODONE    IR 5 milliGRAM(s) Oral every 6 hours PRN Mild Pain (1 - 3)  oxyCODONE    IR 10 milliGRAM(s) Oral every 6 hours PRN Moderate Pain (4 - 6)      Physical Exam:  Constitutional: NAD  Pulmonary: clear to auscultation bilaterally   Cardiovascular: Regular rate and rhythm   Abdomen: incision site clean, dry, intact. Soft, mildly tender, nondistended, no guarding, no rebound, +bowel sounds  Extremities: no lower extremity edema or calve tenderness. SCDs in place     LABS:                        11.8   11.69 )-----------( 294      ( 22 Feb 2019 05:52 )             35.4                 RADIOLOGY & ADDITIONAL TESTS:

## 2019-02-23 NOTE — DISCHARGE NOTE ADULT - MEDICATION SUMMARY - MEDICATIONS TO CHANGE
I will SWITCH the dose or number of times a day I take the medications listed below when I get home from the hospital:    metformin 1000 mg oral tablet  -- 1 tab(s) by mouth 2 times a day    Lantus 100 units/mL subcutaneous solution  -- unit(s) subcutaneous once a day (at bedtime)  -- 10-15 units

## 2019-02-23 NOTE — DISCHARGE NOTE ADULT - CARE PROVIDER_API CALL
Tiffanie Conley)  Obstetrics and Gynecology  [address] [place] [address]  Phone: [phone]  Fax: [phone]  Follow Up Time:

## 2019-02-23 NOTE — DISCHARGE NOTE ADULT - MEDICATION SUMMARY - MEDICATIONS TO TAKE
I will START or STAY ON the medications listed below when I get home from the hospital:  None I will START or STAY ON the medications listed below when I get home from the hospital:    Basaglclinton CarrasquilloPen 100 units/mL subcutaneous solution  -- 15 unit(s) subcutaneous once a day (at bedtime) MDD:15 units  -- Do not drink alcoholic beverages when taking this medication.  It is very important that you take or use this exactly as directed.  Do not skip doses or discontinue unless directed by your doctor.  Keep in refrigerator.  Do not freeze.    -- Indication: For DM Type II     metFORMIN 1000 mg oral tablet, extended release  -- 1 tab(s) by mouth once a day MDD:1   -- Check with your doctor before becoming pregnant.  Do not drink alcoholic beverages when taking this medication.  Obtain medical advice before taking any non-prescription drugs as some may affect the action of this medication.  Swallow whole.  Do not crush.  Take with food or milk.    -- Indication: For DM Type II I will START or STAY ON the medications listed below when I get home from the hospital:    metFORMIN 1000 mg oral tablet, extended release  -- 1 tab(s) by mouth once a day MDD:1   -- Check with your doctor before becoming pregnant.  Do not drink alcoholic beverages when taking this medication.  Obtain medical advice before taking any non-prescription drugs as some may affect the action of this medication.  Swallow whole.  Do not crush.  Take with food or milk.    -- Indication: For DM Type II    Lantus Solostar Pen 100 units/mL subcutaneous solution  -- 15 unit(s) subcutaneous once a day (at bedtime) MDD:15 units  -- Do not drink alcoholic beverages when taking this medication.  It is very important that you take or use this exactly as directed.  Do not skip doses or discontinue unless directed by your doctor.  Keep in refrigerator.  Do not freeze.    -- Indication: For DM Type II

## 2019-02-23 NOTE — DISCHARGE NOTE ADULT - PLAN OF CARE
Healthy recovery from surgery. - Nothing in vagina - no intercourse, tampons, or douching until cleared by your doctor.   - Avoid swimming, tub baths, and heavy lifting until cleared by your doctor.   - Showering is ok.   - Continue oral pain medications as needed for pain.    - Follow up in office in 1-2 weeks for your postoperative visit.  Call the office to confirm the date and time of your follow up appointment.   - Call the office sooner if you develop any fever, heavy bleeding, or severe pain.  Go to the closest emergency room for any of these symptoms if you are not able to contact your doctor.

## 2019-02-23 NOTE — DISCHARGE NOTE ADULT - CARE PLAN
Principal Discharge DX:	Fibroids  Goal:	Healthy recovery from surgery.  Assessment and plan of treatment:	- Nothing in vagina - no intercourse, tampons, or douching until cleared by your doctor.   - Avoid swimming, tub baths, and heavy lifting until cleared by your doctor.   - Showering is ok.   - Continue oral pain medications as needed for pain.    - Follow up in office in 1-2 weeks for your postoperative visit.  Call the office to confirm the date and time of your follow up appointment.   - Call the office sooner if you develop any fever, heavy bleeding, or severe pain.  Go to the closest emergency room for any of these symptoms if you are not able to contact your doctor.

## 2019-02-23 NOTE — PROGRESS NOTE ADULT - ASSESSMENT
37yo POD1 s/p open supracervical hysterectomy, bilateral salpingectomy for fibroid uterus. Pt is hemodynamically stable    Neuro: Pain- Toradol and Tylenol standing. Oxycodone as needed   Cardio: continue to monitor vitals per protocol   Pulm: incentive spirometer at least 10 times per hour while awake   GI: Reg diet, Zofran, Reglan, Simethicone, Colace    : voiding   DVT ppx: SQH BID   Activity- ambulate as tolerated   Dispo: per attending

## 2019-02-25 LAB — SURGICAL PATHOLOGY STUDY: SIGNIFICANT CHANGE UP

## 2019-02-27 DIAGNOSIS — D25.9 LEIOMYOMA OF UTERUS, UNSPECIFIED: ICD-10-CM

## 2019-02-27 DIAGNOSIS — D25.1 INTRAMURAL LEIOMYOMA OF UTERUS: ICD-10-CM

## 2019-02-27 DIAGNOSIS — E11.9 TYPE 2 DIABETES MELLITUS WITHOUT COMPLICATIONS: ICD-10-CM

## 2019-03-03 ENCOUNTER — TRANSCRIPTION ENCOUNTER (OUTPATIENT)
Age: 39
End: 2019-03-03

## 2019-03-29 PROBLEM — D64.9 ANEMIA, UNSPECIFIED: Chronic | Status: ACTIVE | Noted: 2019-02-20

## 2019-03-29 PROBLEM — E83.52 HYPERCALCEMIA: Chronic | Status: ACTIVE | Noted: 2019-02-20

## 2019-04-02 ENCOUNTER — LABORATORY RESULT (OUTPATIENT)
Age: 39
End: 2019-04-02

## 2019-04-02 ENCOUNTER — APPOINTMENT (OUTPATIENT)
Dept: INTERNAL MEDICINE | Facility: CLINIC | Age: 39
End: 2019-04-02
Payer: COMMERCIAL

## 2019-04-02 VITALS
HEIGHT: 67 IN | HEART RATE: 111 BPM | SYSTOLIC BLOOD PRESSURE: 144 MMHG | OXYGEN SATURATION: 98 % | TEMPERATURE: 98.3 F | DIASTOLIC BLOOD PRESSURE: 87 MMHG | RESPIRATION RATE: 17 BRPM | BODY MASS INDEX: 26.84 KG/M2 | WEIGHT: 171 LBS

## 2019-04-02 DIAGNOSIS — Z00.00 ENCOUNTER FOR GENERAL ADULT MEDICAL EXAMINATION W/OUT ABNORMAL FINDINGS: ICD-10-CM

## 2019-04-02 PROCEDURE — 99214 OFFICE O/P EST MOD 30 MIN: CPT

## 2019-04-02 NOTE — HEALTH RISK ASSESSMENT
[Good] : ~his/her~  mood as  good [No falls in past year] : Patient reported no falls in the past year [0] : 2) Feeling down, depressed, or hopeless: Not at all (0) [HIV test declined] : HIV test declined [Hepatitis C test declined] : Hepatitis C test declined [Fully functional (bathing, dressing, toileting, transferring, walking, feeding)] : Fully functional (bathing, dressing, toileting, transferring, walking, feeding) [Fully functional (using the telephone, shopping, preparing meals, housekeeping, doing laundry, using] : Fully functional and needs no help or supervision to perform IADLs (using the telephone, shopping, preparing meals, housekeeping, doing laundry, using transportation, managing medications and managing finances) [With Patient/Caregiver] : With Patient/Caregiver [] : No [de-identified] : 02/19 partial hysterectomy for fibroids [[id number]] : 0 [MammogramComments] : p [BoneDensityComments] : na [ColonoscopyComments] : na [AdvancecareDate] : 04/02

## 2019-04-02 NOTE — HISTORY OF PRESENT ILLNESS
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 Patient is  well controlled, hemoglobin A1c 6%. She was seen here a few years ago but never came for followup. Patient denies hypoglycemia symptoms, denies polyuria polydipsia, her weight is stable. So far no complications found. Ophthalmology exam negative, no signs of renal involvement, denies tingling numbness, denies cardiovascular symptoms, denies claudication. Patient is on Lantus insulin 25 units a day, Humalog insulin 5 units before meals prn(rare use), she is on metformin 2 g a day.

## 2019-04-02 NOTE — ASSESSMENT
[FreeTextEntry1] : 37 yo yo with well controlled T2D,on basal insulin,Metformin,occ.use of bolus insulin.,no complications.\par routine labs,ophth.,mammogram.

## 2019-04-02 NOTE — COUNSELING
[Healthy eating counseling provided] : healthy eating [Activity counseling provided] : activity [Low Fat Diet] : Low fat diet [Walking] : Walking [None] : None [Good understanding] : Patient has a good understanding of lifestyle changes and the steps needed to achieve self management goals [de-identified] : ADA diet

## 2019-04-03 ENCOUNTER — OTHER (OUTPATIENT)
Age: 39
End: 2019-04-03

## 2019-04-03 LAB
ALBUMIN SERPL ELPH-MCNC: 4.7 G/DL
ALP BLD-CCNC: 78 U/L
ALT SERPL-CCNC: 14 U/L
ANION GAP SERPL CALC-SCNC: 13 MMOL/L
APPEARANCE: ABNORMAL
AST SERPL-CCNC: 17 U/L
BASOPHILS # BLD AUTO: 0.02 K/UL
BASOPHILS NFR BLD AUTO: 0.2 %
BILIRUB SERPL-MCNC: 0.4 MG/DL
BILIRUBIN URINE: NEGATIVE
BLOOD URINE: ABNORMAL
BUN SERPL-MCNC: 11 MG/DL
CALCIUM SERPL-MCNC: 10.4 MG/DL
CANCER AG125 SERPL-ACNC: 12 U/ML
CHLORIDE SERPL-SCNC: 102 MMOL/L
CHOLEST SERPL-MCNC: 209 MG/DL
CHOLEST/HDLC SERPL: 4.4 RATIO
CO2 SERPL-SCNC: 23 MMOL/L
COLOR: YELLOW
CREAT SERPL-MCNC: 0.65 MG/DL
CREAT SPEC-SCNC: 223 MG/DL
EOSINOPHIL # BLD AUTO: 0.12 K/UL
EOSINOPHIL NFR BLD AUTO: 1.5 %
ESTIMATED AVERAGE GLUCOSE: 174 MG/DL
GLUCOSE QUALITATIVE U: ABNORMAL
GLUCOSE SERPL-MCNC: 241 MG/DL
HBA1C MFR BLD HPLC: 7.7 %
HCT VFR BLD CALC: 46.4 %
HDLC SERPL-MCNC: 48 MG/DL
HGB BLD-MCNC: 14.8 G/DL
IMM GRANULOCYTES NFR BLD AUTO: 0.4 %
KETONES URINE: NEGATIVE
LDLC SERPL CALC-MCNC: 107 MG/DL
LEUKOCYTE ESTERASE URINE: NEGATIVE
LYMPHOCYTES # BLD AUTO: 2.04 K/UL
LYMPHOCYTES NFR BLD AUTO: 25.3 %
MAN DIFF?: NORMAL
MCHC RBC-ENTMCNC: 30.4 PG
MCHC RBC-ENTMCNC: 31.9 GM/DL
MCV RBC AUTO: 95.3 FL
MICROALBUMIN 24H UR DL<=1MG/L-MCNC: 74.3 MG/DL
MICROALBUMIN/CREAT 24H UR-RTO: 333 MG/G
MONOCYTES # BLD AUTO: 0.41 K/UL
MONOCYTES NFR BLD AUTO: 5.1 %
NEUTROPHILS # BLD AUTO: 5.43 K/UL
NEUTROPHILS NFR BLD AUTO: 67.5 %
NITRITE URINE: NEGATIVE
PH URINE: 5.5
PLATELET # BLD AUTO: 410 K/UL
POTASSIUM SERPL-SCNC: 4.4 MMOL/L
PROT C PPP CHRO-ACNC: 124 %
PROT S AG ACT/NOR PPP IA: 57 %
PROT SERPL-MCNC: 8.1 G/DL
PROTEIN URINE: ABNORMAL
RBC # BLD: 4.87 M/UL
RBC # FLD: 14 %
SODIUM SERPL-SCNC: 138 MMOL/L
SPECIFIC GRAVITY URINE: 1.02
TRIGL SERPL-MCNC: 270 MG/DL
TSH SERPL-ACNC: 1.24 UIU/ML
UROBILINOGEN URINE: NORMAL
WBC # FLD AUTO: 8.05 K/UL

## 2019-04-08 LAB — 25(OH)D3 SERPL-MCNC: 21.7 NG/ML

## 2019-04-09 ENCOUNTER — APPOINTMENT (OUTPATIENT)
Dept: INTERNAL MEDICINE | Facility: CLINIC | Age: 39
End: 2019-04-09

## 2019-04-26 ENCOUNTER — MEDICATION RENEWAL (OUTPATIENT)
Age: 39
End: 2019-04-26

## 2019-07-09 ENCOUNTER — APPOINTMENT (OUTPATIENT)
Dept: INTERNAL MEDICINE | Facility: CLINIC | Age: 39
End: 2019-07-09
Payer: COMMERCIAL

## 2019-07-09 ENCOUNTER — LABORATORY RESULT (OUTPATIENT)
Age: 39
End: 2019-07-09

## 2019-07-09 VITALS
RESPIRATION RATE: 16 BRPM | OXYGEN SATURATION: 99 % | DIASTOLIC BLOOD PRESSURE: 79 MMHG | HEIGHT: 67 IN | TEMPERATURE: 98.2 F | BODY MASS INDEX: 26.68 KG/M2 | HEART RATE: 82 BPM | WEIGHT: 170 LBS | SYSTOLIC BLOOD PRESSURE: 118 MMHG

## 2019-07-09 DIAGNOSIS — R22.1 LOCALIZED SWELLING, MASS AND LUMP, NECK: ICD-10-CM

## 2019-07-09 DIAGNOSIS — R00.2 PALPITATIONS: ICD-10-CM

## 2019-07-09 PROCEDURE — 99213 OFFICE O/P EST LOW 20 MIN: CPT

## 2019-07-09 RX ORDER — FLUTICASONE PROPIONATE 50 UG/1
50 SPRAY, METERED NASAL TWICE DAILY
Qty: 3 | Refills: 2 | Status: DISCONTINUED | COMMUNITY
Start: 2019-04-02 | End: 2019-07-09

## 2019-07-09 NOTE — PHYSICAL EXAM
[No Acute Distress] : no acute distress [Well Nourished] : well nourished [Well Developed] : well developed [PERRL] : pupils equal round and reactive to light [EOMI] : extraocular movements intact [Well-Appearing] : well-appearing [Normal Sclera/Conjunctiva] : normal sclera/conjunctiva [Normal Oropharynx] : the oropharynx was normal [Normal Outer Ear/Nose] : the outer ears and nose were normal in appearance [Supple] : supple [Thyroid Normal, No Nodules] : the thyroid was normal and there were no nodules present [No Lymphadenopathy] : no lymphadenopathy [No JVD] : no jugular venous distention [No Respiratory Distress] : no respiratory distress  [Clear to Auscultation] : lungs were clear to auscultation bilaterally [No Accessory Muscle Use] : no accessory muscle use [Regular Rhythm] : with a regular rhythm [Normal Rate] : normal rate  [Normal S1, S2] : normal S1 and S2 [No Murmur] : no murmur heard [No Carotid Bruits] : no carotid bruits [No Abdominal Bruit] : a ~M bruit was not heard ~T in the abdomen [No Varicosities] : no varicosities [No Extremity Clubbing/Cyanosis] : no extremity clubbing/cyanosis [Pedal Pulses Present] : the pedal pulses are present [No Edema] : there was no peripheral edema [No Palpable Aorta] : no palpable aorta [Soft] : abdomen soft [Non Tender] : non-tender [Non-distended] : non-distended [Normal Bowel Sounds] : normal bowel sounds [No Masses] : no abdominal mass palpated [No HSM] : no HSM [Normal Posterior Cervical Nodes] : no posterior cervical lymphadenopathy [Normal Anterior Cervical Nodes] : no anterior cervical lymphadenopathy [No CVA Tenderness] : no CVA  tenderness [No Joint Swelling] : no joint swelling [No Spinal Tenderness] : no spinal tenderness [Grossly Normal Strength/Tone] : grossly normal strength/tone [No Rash] : no rash [Normal Gait] : normal gait [Coordination Grossly Intact] : coordination grossly intact [No Focal Deficits] : no focal deficits [Deep Tendon Reflexes (DTR)] : deep tendon reflexes were 2+ and symmetric [Normal Affect] : the affect was normal [Normal Insight/Judgement] : insight and judgment were intact [Comprehensive Foot Exam Normal] : Right and left foot were examined and both feet are normal. No ulcers in either foot. Toes are normal and with full ROM.  Normal tactile sensation with monofilament testing throughout both feet [de-identified] : acanthosis

## 2019-07-09 NOTE — ASSESSMENT
[FreeTextEntry1] : 40 yo with  T2D,on basal insulin,Metformin,occ.use of bolus insulin.,no complications.\par routine labs,ophth.,mammogram.

## 2019-07-09 NOTE — COUNSELING
[Weight management counseling provided] : Weight management [Healthy eating counseling provided] : healthy eating [Activity counseling provided] : activity [Low Salt Diet] : Low salt diet [Decrease Portions] : Decrease food portions [Walking] : Walking [de-identified] : ADA diet [None] : None

## 2019-07-09 NOTE — HISTORY OF PRESENT ILLNESS
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 Patient is  not ideally controlled, hemoglobin A1c 7,7%. Patient denies hypoglycemia symptoms, denies polyuria polydipsia, her weight is stable. So far no complications found. Ophthalmology exam negative, no signs of renal involvement, denies tingling numbness, denies cardiovascular symptoms, denies claudication. Patient is on Lantus insulin 25 units a day, Humalog insulin 5 units before meals prn(rare use), she is on metformin 2 g a day.pt lost wt on diet and exercise.she tries not to use Humalog .

## 2019-07-10 LAB
ALBUMIN SERPL ELPH-MCNC: 4.4 G/DL
ALP BLD-CCNC: 73 U/L
ALT SERPL-CCNC: 12 U/L
ANION GAP SERPL CALC-SCNC: 12 MMOL/L
ANION GAP SERPL CALC-SCNC: 13 MMOL/L
APPEARANCE: CLEAR
AST SERPL-CCNC: 19 U/L
BASOPHILS # BLD AUTO: 0.02 K/UL
BASOPHILS # BLD AUTO: 0.02 K/UL
BASOPHILS NFR BLD AUTO: 0.3 %
BASOPHILS NFR BLD AUTO: 0.3 %
BILIRUB SERPL-MCNC: 0.5 MG/DL
BILIRUBIN URINE: NEGATIVE
BLOOD URINE: NEGATIVE
BUN SERPL-MCNC: 10 MG/DL
BUN SERPL-MCNC: 10 MG/DL
CALCIUM SERPL-MCNC: 10.2 MG/DL
CALCIUM SERPL-MCNC: 10.4 MG/DL
CHLORIDE SERPL-SCNC: 103 MMOL/L
CHLORIDE SERPL-SCNC: 104 MMOL/L
CHOLEST SERPL-MCNC: 132 MG/DL
CHOLEST/HDLC SERPL: 2.4 RATIO
CO2 SERPL-SCNC: 21 MMOL/L
CO2 SERPL-SCNC: 23 MMOL/L
COLOR: YELLOW
CREAT SERPL-MCNC: 0.81 MG/DL
CREAT SERPL-MCNC: 0.84 MG/DL
CREAT SPEC-SCNC: 325 MG/DL
CREAT SPEC-SCNC: 329 MG/DL
EOSINOPHIL # BLD AUTO: 0.06 K/UL
EOSINOPHIL # BLD AUTO: 0.09 K/UL
EOSINOPHIL NFR BLD AUTO: 0.9 %
EOSINOPHIL NFR BLD AUTO: 1.2 %
ESTIMATED AVERAGE GLUCOSE: 163 MG/DL
ESTIMATED AVERAGE GLUCOSE: 163 MG/DL
GLUCOSE QUALITATIVE U: NEGATIVE
GLUCOSE SERPL-MCNC: 143 MG/DL
GLUCOSE SERPL-MCNC: 145 MG/DL
HBA1C MFR BLD HPLC: 7.3 %
HBA1C MFR BLD HPLC: 7.3 %
HCT VFR BLD CALC: 45.3 %
HCT VFR BLD CALC: 45.3 %
HDLC SERPL-MCNC: 55 MG/DL
HGB BLD-MCNC: 14.3 G/DL
HGB BLD-MCNC: 14.5 G/DL
IMM GRANULOCYTES NFR BLD AUTO: 0.1 %
IMM GRANULOCYTES NFR BLD AUTO: 0.1 %
KETONES URINE: NORMAL
LDLC SERPL CALC-MCNC: 56 MG/DL
LEUKOCYTE ESTERASE URINE: NEGATIVE
LYMPHOCYTES # BLD AUTO: 2.43 K/UL
LYMPHOCYTES # BLD AUTO: 2.45 K/UL
LYMPHOCYTES NFR BLD AUTO: 34 %
LYMPHOCYTES NFR BLD AUTO: 34.7 %
MAN DIFF?: NORMAL
MAN DIFF?: NORMAL
MCHC RBC-ENTMCNC: 30.4 PG
MCHC RBC-ENTMCNC: 30.9 PG
MCHC RBC-ENTMCNC: 31.6 GM/DL
MCHC RBC-ENTMCNC: 32 GM/DL
MCV RBC AUTO: 96.2 FL
MCV RBC AUTO: 96.6 FL
MICROALBUMIN 24H UR DL<=1MG/L-MCNC: 58.2 MG/DL
MICROALBUMIN 24H UR DL<=1MG/L-MCNC: 61.5 MG/DL
MICROALBUMIN/CREAT 24H UR-RTO: 179 MG/G
MICROALBUMIN/CREAT 24H UR-RTO: 187 MG/G
MONOCYTES # BLD AUTO: 0.38 K/UL
MONOCYTES # BLD AUTO: 0.4 K/UL
MONOCYTES NFR BLD AUTO: 5.3 %
MONOCYTES NFR BLD AUTO: 5.7 %
NEUTROPHILS # BLD AUTO: 4.08 K/UL
NEUTROPHILS # BLD AUTO: 4.26 K/UL
NEUTROPHILS NFR BLD AUTO: 58.3 %
NEUTROPHILS NFR BLD AUTO: 59.1 %
NITRITE URINE: NEGATIVE
PH URINE: 5.5
PLATELET # BLD AUTO: 401 K/UL
PLATELET # BLD AUTO: 403 K/UL
POTASSIUM SERPL-SCNC: 5.1 MMOL/L
POTASSIUM SERPL-SCNC: 5.1 MMOL/L
PROT SERPL-MCNC: 7.6 G/DL
PROTEIN URINE: ABNORMAL
RBC # BLD: 4.69 M/UL
RBC # BLD: 4.71 M/UL
RBC # FLD: 12.9 %
RBC # FLD: 13.2 %
SODIUM SERPL-SCNC: 138 MMOL/L
SODIUM SERPL-SCNC: 138 MMOL/L
SPECIFIC GRAVITY URINE: 1.03
TRIGL SERPL-MCNC: 103 MG/DL
UROBILINOGEN URINE: NORMAL
WBC # FLD AUTO: 7 K/UL
WBC # FLD AUTO: 7.21 K/UL

## 2020-03-19 RX ORDER — BLOOD-GLUCOSE METER
KIT MISCELLANEOUS
Qty: 1 | Refills: 0 | Status: ACTIVE | COMMUNITY
Start: 2019-04-08 | End: 1900-01-01

## 2020-04-25 ENCOUNTER — MESSAGE (OUTPATIENT)
Age: 40
End: 2020-04-25

## 2020-05-07 ENCOUNTER — TRANSCRIPTION ENCOUNTER (OUTPATIENT)
Age: 40
End: 2020-05-07

## 2020-12-04 NOTE — ED ADULT TRIAGE NOTE - NS ED NURSE DIRECT TO ROOM YN
HPI     78 YO male presents today for a [facility] F/U. He states that he is doing well,   notes that he does need new script for glasses.     Latanoprost OU QHS  Timolol OU BID     Hemoglobin A1C       Date                     Value               Ref Range             Status                08/11/2020               7.3 (H)             4.0 - 5.6 %           Final                 02/19/2020               7.4 (H)             4.0 - 5.6 %           Final                  07/22/2019               7.3 (H)             4.0 - 5.6 %           Final                  Last edited by Roman Amos, OD on 12/4/2020 10:02 AM. (History)            Assessment /Plan     For exam results, see Encounter Report.    Ocular hypertension, left  -     latanoprost (XALATAN) 0.005 % ophthalmic solution; Place 1 drop into both eyes once daily.  Dispense: 3 Bottle; Refill: 4    Anatomical narrow angle of both eyes  -     latanoprost (XALATAN) 0.005 % ophthalmic solution; Place 1 drop into both eyes once daily.  Dispense: 3 Bottle; Refill: 4    Diabetes mellitus type 2 without retinopathy    Diabetic cataract    Nuclear sclerosis, bilateral    Cortical cataract    Refractive error      iop stable with use of drops. Continue timoptic bid OU  latanoprost qPM OU. HVF stable from previous. Return in 5 months for iop check  pachy.    Anatomical narrow angle, s/p LPI OU. Patent, discussed signs/symptoms of angle closure. Monitor yearly.     DM type 2 w/o ocular retinopathy OU. Discussed possible ocular affects of uncontrolled blood sugar with patient. Recommended continued strong blood sugar control and continued care with PCP. Monitor yearly.     Moderate cataracts OU, not yet visually significant. Discussed possible ocular affects of cataracts. Acceptable BCVA OU. Discussed treatment options. Surgery not recommended at this time. Monitor yearly.     Dispensed updated spectacle Rx. Discussed various spectacle lens options. Discussed adaptation period to new  specs.  Demonstrated new spec Rx vs current specs in phoropter with patient satisfaction.        RTC in 5 months for iop check  pachy                No

## 2021-05-06 ENCOUNTER — APPOINTMENT (OUTPATIENT)
Dept: CARDIOLOGY | Facility: CLINIC | Age: 41
End: 2021-05-06

## 2021-08-13 NOTE — ED ADULT NURSE NOTE - [id number]_ED_A_ED
Airway    Date/Time: 8/13/2021 8:33 AM  Performed by: Alfonso Jiménez M.D.  Authorized by: Alfonso Jiménez M.D.     Location:  OR  Urgency:  Elective  Indications for Airway Management:  Anesthesia      Spontaneous Ventilation: absent    Sedation Level:  Deep  Preoxygenated: Yes    Patient Position:  Sniffing  Final Airway Type:  Endotracheal airway  Final Endotracheal Airway:  ETT  Cuffed: Yes    Technique Used for Successful ETT Placement:  Direct laryngoscopy    Insertion Site:  Oral  Blade Type:  Morgan  Laryngoscope Blade/Videolaryngoscope Blade Size:  3  ETT Size (mm):  7.0  Measured from:  Teeth  ETT to Teeth (cm):  21  Placement Verified by: auscultation and capnometry    Cormack-Lehane Classification:  Grade IIa - partial view of glottis  Number of Attempts at Approach:  1          
Arterial Line  Performed by: Alfonso Jiménez M.D.  Authorized by: Alfonso Jiménez M.D.     Start Time:  8/13/2021 8:33 AM  End Time:  8/13/2021 8:33 AM  Localization: surface landmarks    Patient Location:  OR  Indication: continuous blood pressure monitoring        Catheter Size:  20 G  Seldinger Technique?: Yes    Site:  Radial artery  Line Secured:  Antimicrobial disc, tape and transparent dressing  Events: patient tolerated procedure well with no complications          
ROSALINDA    Date/Time: 8/13/2021 8:37 AM  Performed by: Alfonso Jiménez M.D.  Authorized by: Alfonso Jiménez M.D.     Start Time:8/13/2021 8:37 AM                **See FULL ROSALINDA report in patient's chart via [facility]**        
No

## 2022-04-12 NOTE — ASU PATIENT PROFILE, ADULT - MENTAL HEALTH CONDITIONS/SYMPTOMS, PROFILE
Liat Eisenberg  : 1947  Primary: [employer] Hmo  Secondary: [facility]  [address], [address]  [phone]   [phone]         OUTPATIENT PHYSICAL THERAPY:Daily Note 2022      TREATMENT:   PT Patient Time In/Time Out  Time In: 0930  Time Out: 1010      Total Time: 45min  Visit Count:  9     ICD-10: Treatment Diagnosis: M25.562  Medication Last Reviewed: 22      TREATMENT PLAN  Effective Dates: 2022 TO 2022 (90 days). Frequency/Duration: 1 time a week for 90 Day(s)         Subjective: See discharge Note dated 2022 for details   Pain: 0/10    Objective: See discharge Note dated 2022 for details      Therapeutic Exercise: (45min) Done in order to improve strength, ROM and understanding of current condition.     Date:   3/22 Date:  3/29 Date:   Date:     Activity/Exercise       Education    D/C assessment, HEP review and demo   NuStep x10min lvl 4 x10min lvl 4 x10min lvl 4 x10min lvl 5   SLR       Hip ABD 3x10 B 17.5lbs   3x10 B 25   Hip Extension       Bridges       LAQ  3x10 B 5lbs     Sidestepping 3x1min red TB 3x1min green TB     Shuttle Press · DL: 3x10 85lbs · DL: 3x10 100lbs  · B SL: 3x10 B 65lbs · DL: 3x10 115lbs  · B SL: 3x10 115lbs · DL: 3x10 150lbs  · B SL: 3x10 115lbs   Hip Flexion 3x10 B 17.5lbs      Sit to Stand [id number]                                                   Manual Therapy: (0min) Done in order to improve joint and soft tissue mobility,reduce muscle guarding, and decrease muscle tone   Date:   Date:   Date:  3/1 Date:     Type Parameters      Joint Mobilization Knee: P-A/distraction grades II-IV Knee: P-A/distraction grades II-IV Knee: P-A/distraction grades II-IV    Soft Tissue Mobilization           Modalities: (-) Done in order to reduce swelling and pain    Assessment: See discharge Note dated 2022 for details    Plan: See discharge Note dated 4/12/2022 for details    Future Appointments   Date Time Provider Bria Germaini   9/9/2022  9:00 AM Amrita [address] [facility]   9/9/2022  9:30 AM Walter Curiel MD [facility]       Unbilled Time:   Units: Chelly Reyes, PT, DPT, OCS    Visit Approval Visit # Therapist initials Date A NS / Cx < 24 hr >24 hr Cx Comments    1 WJ 2/16 [x]  [] [] Initial evaluation    2 WJ 2/22 [x] [] []     3 WJ 3/1 [x] [] []     4 WJ 3/8 [x] [] []     5 WJ 3/15 [x] [] []     6 WJ 3/22 [x] [] []     7 [address] 3/29 [x] [] []     8 WJ 4/5 [x] [] []     9 WJ 4/12 [x] [] [] Discharge        [] [] []        [] [] []        [] [] []        [] [] []        [] [] []        [] [] []        [] [] []        [] [] []        [] [] [] none

## 2022-06-28 ENCOUNTER — APPOINTMENT (OUTPATIENT)
Dept: INTERNAL MEDICINE | Facility: CLINIC | Age: 42
End: 2022-06-28

## 2022-08-10 ENCOUNTER — APPOINTMENT (OUTPATIENT)
Dept: INTERNAL MEDICINE | Facility: CLINIC | Age: 42
End: 2022-08-10

## 2022-10-14 NOTE — ASSESSMENT
[FreeTextEntry1] : 41 yo with  T2D,on basal insulin,Metformin,occ.use of bolus insulin.,no complications.\par routine labs,ophth.,mammogram.

## 2022-10-14 NOTE — PHYSICAL EXAM

## 2022-10-14 NOTE — HISTORY OF PRESENT ILLNESS
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   not ideally controlled, hemoglobin A1c 7,3%. Patient denies hypoglycemia symptoms, denies polyuria polydipsia, her weight is stable. So far no complications found. Ophthalmology exam negative, no signs of renal involvement, denies tingling numbness, denies cardiovascular symptoms, denies claudication. Patient is on Lantus insulin 25 units a day, Humalog insulin 5 units before meals prn(rare use), she is on metformin 2 g a day.pt lost wt on diet and exercise.she tries not to use Humalog .

## 2022-10-19 ENCOUNTER — APPOINTMENT (OUTPATIENT)
Dept: INTERNAL MEDICINE | Facility: CLINIC | Age: 42
End: 2022-10-19
Payer: COMMERCIAL

## 2022-11-18 ENCOUNTER — NON-APPOINTMENT (OUTPATIENT)
Age: 42
End: 2022-11-18

## 2022-11-29 NOTE — ED ADULT TRIAGE NOTE - BMI (KG/M2)
Spoke with Deandre Ortega, relayed [employer]. Pt says as far as she knows the mid line was removed. She will picking up imdur from pharmacy today. 31.9

## 2023-01-11 ENCOUNTER — APPOINTMENT (OUTPATIENT)
Dept: INTERNAL MEDICINE | Facility: CLINIC | Age: 43
End: 2023-01-11
Payer: COMMERCIAL

## 2023-01-11 ENCOUNTER — LABORATORY RESULT (OUTPATIENT)
Age: 43
End: 2023-01-11

## 2023-01-11 ENCOUNTER — APPOINTMENT (OUTPATIENT)
Dept: ENDOCRINOLOGY | Facility: CLINIC | Age: 43
End: 2023-01-11
Payer: COMMERCIAL

## 2023-01-11 VITALS
WEIGHT: 181 LBS | TEMPERATURE: 97.2 F | DIASTOLIC BLOOD PRESSURE: 89 MMHG | HEIGHT: 64 IN | SYSTOLIC BLOOD PRESSURE: 156 MMHG | BODY MASS INDEX: 30.9 KG/M2 | RESPIRATION RATE: 16 BRPM | OXYGEN SATURATION: 98 % | HEART RATE: 84 BPM

## 2023-01-11 DIAGNOSIS — I10 ESSENTIAL (PRIMARY) HYPERTENSION: ICD-10-CM

## 2023-01-11 DIAGNOSIS — B37.31 ACUTE CANDIDIASIS OF VULVA AND VAGINA: ICD-10-CM

## 2023-01-11 DIAGNOSIS — E11.9 TYPE 2 DIABETES MELLITUS W/OUT COMPLICATIONS: ICD-10-CM

## 2023-01-11 PROCEDURE — 99214 OFFICE O/P EST MOD 30 MIN: CPT

## 2023-01-11 PROCEDURE — 99205 OFFICE O/P NEW HI 60 MIN: CPT | Mod: 95

## 2023-01-11 NOTE — HISTORY OF PRESENT ILLNESS
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   not ideally controlled, hemoglobin A1c >9% at another office,6 months ago. Patient denies hypoglycemia symptoms, denies polyuria polydipsia, gained weight again. So far no complications found. Ophthalmology exam negative, no signs of renal involvement, denies tingling numbness, denies cardiovascular symptoms, denies claudication. Patient is on Semglee insulin 25 units HS, Humalog insulin 10 units before meals prn(rare use), she is on metformin 2 g a day.\par also reports recent BP elevation,tried ACEi,it did not work,Norvasc 5 mg worked,but she stopped it a long time ago for no reason.\par denies HA's,amaurosis,dizziness,palpitations,c.p.,sob,claudication

## 2023-01-11 NOTE — ASSESSMENT
[FreeTextEntry1] : 41 yo with  T2D,on basal insulin,Metformin,occ.use of bolus insulin.,no complications.\par routine labs,ophth.,mammogram.\par endocrine eval,needs GLP-1

## 2023-01-11 NOTE — PHYSICAL EXAM
[No Acute Distress] : no acute distress [Well Nourished] : well nourished [Well Developed] : well developed [Well-Appearing] : well-appearing [Normal Sclera/Conjunctiva] : normal sclera/conjunctiva [PERRL] : pupils equal round and reactive to light [EOMI] : extraocular movements intact [Normal Outer Ear/Nose] : the outer ears and nose were normal in appearance [Normal Oropharynx] : the oropharynx was normal [No JVD] : no jugular venous distention [Supple] : supple [No Lymphadenopathy] : no lymphadenopathy [Thyroid Normal, No Nodules] : the thyroid was normal and there were no nodules present [No Respiratory Distress] : no respiratory distress  [Clear to Auscultation] : lungs were clear to auscultation bilaterally [No Accessory Muscle Use] : no accessory muscle use [Normal Rate] : normal rate  [Regular Rhythm] : with a regular rhythm [Normal S1, S2] : normal S1 and S2 [No Murmur] : no murmur heard [No Carotid Bruits] : no carotid bruits [No Abdominal Bruit] : a ~M bruit was not heard ~T in the abdomen [No Varicosities] : no varicosities [Pedal Pulses Present] : the pedal pulses are present [No Edema] : there was no peripheral edema [No Extremity Clubbing/Cyanosis] : no extremity clubbing/cyanosis [No Palpable Aorta] : no palpable aorta [Soft] : abdomen soft [Non Tender] : non-tender [Non-distended] : non-distended [No Masses] : no abdominal mass palpated [No HSM] : no HSM [Normal Bowel Sounds] : normal bowel sounds [Normal Posterior Cervical Nodes] : no posterior cervical lymphadenopathy [Normal Anterior Cervical Nodes] : no anterior cervical lymphadenopathy [No CVA Tenderness] : no CVA  tenderness [No Spinal Tenderness] : no spinal tenderness [No Joint Swelling] : no joint swelling [Grossly Normal Strength/Tone] : grossly normal strength/tone [No Rash] : no rash [Normal Gait] : normal gait [Coordination Grossly Intact] : coordination grossly intact [No Focal Deficits] : no focal deficits [Normal Affect] : the affect was normal [Deep Tendon Reflexes (DTR)] : deep tendon reflexes were 2+ and symmetric [Normal Insight/Judgement] : insight and judgment were intact [Comprehensive Foot Exam Normal] : Right and left foot were examined and both feet are normal. No ulcers in either foot. Toes are normal and with full ROM.  Normal tactile sensation with monofilament testing throughout both feet [de-identified] : acanthosis

## 2023-01-11 NOTE — REVIEW OF SYSTEMS
[Fatigue] : fatigue [Recent Weight Gain (___ Lbs)] : recent weight gain: [unfilled] lbs [Polyuria] : polyuria [Pain/Numbness of Digits] : pain/numbness of digits [Decreased Appetite] : appetite not decreased [Visual Field Defect] : no visual field defect [Blurred Vision] : no blurred vision [Neck Pain] : no neck pain [Oral Ulcers] : no oral ulcers [Chest Pain] : no chest pain [Palpitations] : no palpitations [Fast Heart Rate] : heart rate is not fast [Lower Ext Edema] : no lower extremity edema [Shortness Of Breath] : no shortness of breath [Cough] : no cough [Wheezing] : no wheezing [SOB on Exertion] : no shortness of breath on exertion [Nausea] : no nausea [Constipation] : no constipation [Abdominal Pain] : no abdominal pain [Vomiting] : no vomiting [Diarrhea] : no diarrhea [Headaches] : no headaches [Dizziness] : no dizziness [Confusion] : no confusion [Tremors] : no tremors [Depression] : no depression [Insomnia] : no insomnia [Polydipsia] : no polydipsia

## 2023-01-11 NOTE — ADDENDUM
[FreeTextEntry1] : 60  minutes spent the time noted on the day of this patient encounter preparing for, reviewing records, providing and documenting the above E/M service and 50% of time spent face to face counseling and education provided to patient on disease course, and treatment/management. All questions and concerns were answered and addressed in detail.

## 2023-01-11 NOTE — HISTORY OF PRESENT ILLNESS
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 At age of 22 years re-diagnosed as type 2 Diabetes. \par Medication regimen: Semglee 25 units at night, Metformin ER 1000 mg BID ( non-compliance to full dose of metformin), Humalog 10-12 units with meals\par Recent fingersticks:   190s-200s\par Hypoglycemic events:  Denies\par Diet:  She work nights, has odd eating times\par BF: Typically eat fast food high carbohydrate diet at work. Eggs, wheat bread at home\par Lunch: Skips lunch \par Dinner: sometimes eat nuts, rice, chicken\par Has started drinking orange juices recently. States she will try to reduce the juice intake \par Exercise:  Have been exercising lately, used to do weight lifting, \par Opthalmology appointment: Last year, Denies diabetic retinopathy \par Peripheral Neuropathy: Feels tingling during the winter season\par CVD: Denies\par Infections: Yeast infections, \par Vaccinations: Covid vaccines, Flu vaccines\par  \par \par  \par  \par  \par \par \par \par

## 2023-01-11 NOTE — ASSESSMENT
[FreeTextEntry1] : 1) Poorly controlled Type 2 Diabetes\par Last HBA1C is 7.3% in 2019\par reported Blood sugars are high\par Patient is non- compliant to medications and low carbohydrate diet\par \par PLAN:\par - continue Metformin 1000mg PO BID ( Advise to take with food to avoid GI side effects, encouraged compliance)\par - Continue Semglee 25 units at night, Humalog 10-12 units with meals. \par - Will prescribe ozempic if insurance approve, will start ozempic 0.25mg weekly x 4 weeks, then increase to 0.5mg weekly.  Confirmed no history of pancreatitis or personal or family history of medullary thyroid cancer. \par - Advised patient to measure sugars before meals and bedtime and have a log book of the numbers. Patient to bring log book prior to next Endocrine appointment\par - Advised to follow up with opthalmology and podiatry\par - Medication compliance re-emphasized.\par - Encouraged self-feet examination /proper shoes use ; Podiatry referral\par - Does not want to see Nutritionist \par \par - DIABETES EDUCATION:\par Extensive education about diabetes, hyperglycemia, hypoglycemia, glucose target ranges, dietary adherence to ADA diet, avoiding rice and sweets, eating fruits and fresh vegetables highly recommended. Discussed the 6-inch plate method. Advised to avoid juice as it can contribute to weight gain \par Advise to increase the physical activity . \par Reviewed the importance of following up with outpatient endocrinology/ opthalmology and podiatry appointments discussed. \par Explained the definition of HBA1C and target range. \par Explained the complications of diabetes including stroke, renal failure and blindness\par Reviewed hypoglycemic sign/symptoms and necessary precautions. Advised to buy a glucose tab available OTC and check the Blood sugars when she is feeling lightheadedness, take half cup of juice or a glucose tab and recheck in 15 min to see if BS improves. \par Patient verbalized understanding and agrees with the plan \par \par \par \par  RTC in 2 months

## 2023-01-12 RX ORDER — BLOOD-GLUCOSE,RECEIVER,CONT
EACH MISCELLANEOUS
Qty: 1 | Refills: 0 | Status: ACTIVE | COMMUNITY
Start: 2023-01-12 | End: 1900-01-01

## 2023-01-13 ENCOUNTER — NON-APPOINTMENT (OUTPATIENT)
Age: 43
End: 2023-01-13

## 2023-01-13 ENCOUNTER — RX RENEWAL (OUTPATIENT)
Age: 43
End: 2023-01-13

## 2023-01-17 LAB
25(OH)D3 SERPL-MCNC: 23.4 NG/ML
ALBUMIN SERPL ELPH-MCNC: 4.2 G/DL
ALP BLD-CCNC: 82 U/L
ALT SERPL-CCNC: 22 U/L
ANION GAP SERPL CALC-SCNC: 10 MMOL/L
APPEARANCE: ABNORMAL
AST SERPL-CCNC: 17 U/L
BASOPHILS # BLD AUTO: 0.02 K/UL
BASOPHILS NFR BLD AUTO: 0.4 %
BILIRUB SERPL-MCNC: 0.3 MG/DL
BILIRUBIN URINE: NEGATIVE
BLOOD URINE: NEGATIVE
BUN SERPL-MCNC: 9 MG/DL
CALCIUM SERPL-MCNC: 10.2 MG/DL
CHLORIDE SERPL-SCNC: 101 MMOL/L
CHOLEST SERPL-MCNC: 203 MG/DL
CO2 SERPL-SCNC: 27 MMOL/L
COLOR: NORMAL
CREAT SERPL-MCNC: 0.92 MG/DL
CREAT SPEC-SCNC: 74 MG/DL
EGFR: 81 ML/MIN/1.73M2
EOSINOPHIL # BLD AUTO: 0.07 K/UL
EOSINOPHIL NFR BLD AUTO: 1.2 %
ESTIMATED AVERAGE GLUCOSE: 258 MG/DL
GLUCOSE QUALITATIVE U: ABNORMAL
GLUCOSE SERPL-MCNC: 328 MG/DL
HBA1C MFR BLD HPLC: 10.6 %
HCT VFR BLD CALC: 46.2 %
HDLC SERPL-MCNC: 58 MG/DL
HGB BLD-MCNC: 14.4 G/DL
IMM GRANULOCYTES NFR BLD AUTO: 0 %
KETONES URINE: NEGATIVE
LDLC SERPL CALC-MCNC: 92 MG/DL
LEUKOCYTE ESTERASE URINE: ABNORMAL
LYMPHOCYTES # BLD AUTO: 2.08 K/UL
LYMPHOCYTES NFR BLD AUTO: 37 %
MAN DIFF?: NORMAL
MCHC RBC-ENTMCNC: 30.4 PG
MCHC RBC-ENTMCNC: 31.2 GM/DL
MCV RBC AUTO: 97.5 FL
MICROALBUMIN 24H UR DL<=1MG/L-MCNC: 16.3 MG/DL
MICROALBUMIN/CREAT 24H UR-RTO: 222 MG/G
MONOCYTES # BLD AUTO: 0.4 K/UL
MONOCYTES NFR BLD AUTO: 7.1 %
NEUTROPHILS # BLD AUTO: 3.05 K/UL
NEUTROPHILS NFR BLD AUTO: 54.3 %
NITRITE URINE: NEGATIVE
NONHDLC SERPL-MCNC: 145 MG/DL
PH URINE: 6
PLATELET # BLD AUTO: 384 K/UL
POTASSIUM SERPL-SCNC: 5.1 MMOL/L
PROT SERPL-MCNC: 7 G/DL
PROTEIN URINE: ABNORMAL
RBC # BLD: 4.74 M/UL
RBC # FLD: 13 %
SODIUM SERPL-SCNC: 137 MMOL/L
SPECIFIC GRAVITY URINE: 1.03
TRIGL SERPL-MCNC: 266 MG/DL
TSH SERPL-ACNC: 0.84 UIU/ML
UROBILINOGEN URINE: NORMAL
VIT B12 SERPL-MCNC: 1623 PG/ML
WBC # FLD AUTO: 5.62 K/UL

## 2023-01-24 ENCOUNTER — NON-APPOINTMENT (OUTPATIENT)
Age: 43
End: 2023-01-24

## 2023-01-25 ENCOUNTER — APPOINTMENT (OUTPATIENT)
Dept: ENDOCRINOLOGY | Facility: CLINIC | Age: 43
End: 2023-01-25

## 2023-01-30 RX ORDER — TIRZEPATIDE 2.5 MG/.5ML
2.5 INJECTION, SOLUTION SUBCUTANEOUS
Qty: 1 | Refills: 0 | Status: COMPLETED | COMMUNITY
Start: 2023-01-30 | End: 2023-02-27

## 2023-04-18 RX ORDER — SEMAGLUTIDE 1.34 MG/ML
2 INJECTION, SOLUTION SUBCUTANEOUS
Qty: 2 | Refills: 5 | Status: COMPLETED | COMMUNITY
Start: 2023-01-11 | End: 2023-04-18

## 2023-05-03 ENCOUNTER — APPOINTMENT (OUTPATIENT)
Dept: INTERNAL MEDICINE | Facility: CLINIC | Age: 43
End: 2023-05-03

## 2023-06-28 ENCOUNTER — APPOINTMENT (OUTPATIENT)
Dept: INTERNAL MEDICINE | Facility: CLINIC | Age: 43
End: 2023-06-28

## 2023-07-09 ENCOUNTER — RX RENEWAL (OUTPATIENT)
Age: 43
End: 2023-07-09

## 2023-08-01 NOTE — HISTORY OF PRESENT ILLNESS
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   not ideally controlled, hemoglobin A1c >9% at another office,6 months ago. Patient denies hypoglycemia symptoms, denies polyuria polydipsia, gained weight again. So far no complications found. Ophthalmology exam negative, no signs of renal involvement, denies tingling numbness, denies cardiovascular symptoms, denies claudication. Patient is on Semglee insulin 25 units HS, Humalog insulin 10 units before meals prn(rare use), she is on metformin 2 g a day.\par also reports recent BP elevation,tried ACEi,it did not work,Norvasc 5 mg worked,but she stopped it a long time ago for no reason.\par denies HA's,amaurosis,dizziness,palpitations,c.p.,sob,claudication

## 2023-08-22 RX ORDER — METFORMIN ER 500 MG 500 MG/1
500 TABLET ORAL
Qty: 360 | Refills: 3 | Status: ACTIVE | COMMUNITY
Start: 2018-12-26 | End: 1900-01-01

## 2023-08-23 ENCOUNTER — RX RENEWAL (OUTPATIENT)
Age: 43
End: 2023-08-23

## 2023-08-23 ENCOUNTER — APPOINTMENT (OUTPATIENT)
Dept: ENDOCRINOLOGY | Facility: CLINIC | Age: 43
End: 2023-08-23
Payer: COMMERCIAL

## 2023-08-23 DIAGNOSIS — E66.9 OBESITY, UNSPECIFIED: ICD-10-CM

## 2023-08-23 PROCEDURE — G2212 PROLONG OUTPT/OFFICE VIS: CPT | Mod: 95

## 2023-08-23 PROCEDURE — 99215 OFFICE O/P EST HI 40 MIN: CPT | Mod: 25,95

## 2023-08-23 RX ORDER — BLOOD-GLUCOSE METER
EACH MISCELLANEOUS
Qty: 1 | Refills: 2 | Status: ACTIVE | COMMUNITY
Start: 2023-08-23 | End: 1900-01-01

## 2023-08-23 RX ORDER — 70%ISOPROPYL ALCOHOL 0.7 ML/ML
70 SWAB TOPICAL
Qty: 1 | Refills: 2 | Status: ACTIVE | COMMUNITY
Start: 2023-08-23 | End: 1900-01-01

## 2023-08-23 NOTE — ADDENDUM
[FreeTextEntry1] :  minutes spent the time noted on the day of this patient encounter preparing for, reviewing records, providing and documenting the above E/M service and 50% of time spent face to face counseling and education.  This also included an extensive discussion on complication of diabetes that she can develop due to medication noncompliance.  Explained patient the disease course, and treatment/management. All questions and concerns were answered and addressed in detail.

## 2023-08-23 NOTE — ASSESSMENT
[FreeTextEntry1] : Last HBA1C was 10.6% in Jan 2023, she was told initially that she had type 1 diabetes that was told that she has type 2 diabetes. She has not done any blood work recently. Patient does not take her insulin for many days but never developed DKA Patient is non- compliant to medications and low carbohydrate diet.  Using Dexcom CGM the CGM data shows that all her blood sugars are greater than 250. She does not want to increase metformin because it causes symptoms of fatigue. Will repeat the autoimmune work up  PLAN: - continue Metformin  daily for now advise to take with food to avoid GI side effects, encouraged compliance) -Increase Semglee 30 units at night, discussed that she can gradually increase by 4 units every 2 days if the fasting blood sugars are greater than 200.  Discussed that even if she is eating a small carb meal, she should inject low dose of Humalog ( 4 units) regardless of her blood sugars at the time of meals.  If she is eating a heavy carb meal, she can take Humalog 10-12 units before meals. - restart ozempic to 1 mg weekly.  Confirmed no history of pancreatitis or personal or family history of medullary thyroid cancer. -Not on lisinopril or atorvastatin for cardio and renal protection - Advised to follow up with ophthalmology and podiatry - Medication compliance re-emphasized. -  Podiatry referral ordered -Check lab, will evaluate to restart lisinopril and atorvastatin based upon the lab test   I had an extensive discussion with patient regarding complications of diabetes including stroke, amputation, MI and blindness.  Discussed with patient that she has a very strong family history of diabetes with complications such as end-stage renal disease.  Therefore, I encouraged her to be compliant with insulin. Advise to change nighttime job as it would be harder for her to lose weight and diabetes control as her circadian rhythm is altered. Encouraged to do frequent diabetes follow-up given her diabetes is severely uncontrolled. RTC in 1 month to 2 months.

## 2023-08-23 NOTE — HISTORY OF PRESENT ILLNESS
[FreeTextEntry1] :  This visit was provided via telehealth using real-time 2-way audio visual technology. The patient was located at home at the time of the visit. The provider, DIPESH ANTHONY, was located at the medical office located in [address] [place] [address] at the time of the visit. The patient and Provider participated in the telehealth encounter. Verbal consent given by the patient, self.   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 At age of 22 years re-diagnosed as type 2 Diabetes.  Medication regimen: Semglee 25 units at night, Metformin  mg BID, Humalog 10-12 units with meals. Ozempic 1 mg weekly, three weeks patient is noncompliant to medication. She takes Humalog sometimes before or after the meals.  If she is not eating a carb heavy diet, she does not inject Humalog at all. She does not take insulin and metformin for weeks.  She has not inject Ozempic for last 3 weeks, she is not seeing weight loss affect. Use a Dexcom G6 CGM: Time in target range: 1% Time above target: 99% Time below target: Less than 1% Hypoglycemic events: Denies. Diet: She work at nights, has odd eating times BF: Typically eat fast food high carbohydrate diet at work. Eggs, wheat bread at home Lunch: Skips lunch Dinner: sometimes eat nuts, rice, chicken Ophthalmology appointment: Last year, Denies diabetic retinopathy, has not made an appointment yet Peripheral Neuropathy: Feels tingling during the winter season, has seen Podiatrist.  CVD: Denies Infections: Yeast infections, Vaccinations: Yes Covid vaccines, Flu vaccines

## 2023-08-23 NOTE — REVIEW OF SYSTEMS
[Fatigue] : fatigue [Blurred Vision] : blurred vision [Recent Weight Gain (___ Lbs)] : recent weight gain: [unfilled] lbs [Polyuria] : polyuria [Pain/Numbness of Digits] : pain/numbness of digits [Depression] : depression [Dysphagia] : no dysphagia [Neck Pain] : no neck pain [Chest Pain] : no chest pain [Palpitations] : no palpitations [Lower Ext Edema] : no lower extremity edema [Shortness Of Breath] : no shortness of breath [Cough] : no cough [SOB on Exertion] : no shortness of breath on exertion [Nausea] : no nausea [Constipation] : no constipation [Abdominal Pain] : no abdominal pain [Vomiting] : no vomiting [Diarrhea] : no diarrhea [Irregular Menses] : regular menses [Joint Pain] : no joint pain [Muscle Weakness] : no muscle weakness [Myalgia] : no myalgia  [Headaches] : no headaches [Dizziness] : no dizziness [Tremors] : no tremors [Suicidal Ideation] : no suicidal ideation [Polydipsia] : no polydipsia [Cold Intolerance] : no cold intolerance

## 2023-10-02 ENCOUNTER — RX RENEWAL (OUTPATIENT)
Age: 43
End: 2023-10-02

## 2023-11-09 ENCOUNTER — APPOINTMENT (OUTPATIENT)
Dept: ENDOCRINOLOGY | Facility: CLINIC | Age: 43
End: 2023-11-09
Payer: COMMERCIAL

## 2023-11-09 VITALS
HEART RATE: 107 BPM | SYSTOLIC BLOOD PRESSURE: 149 MMHG | RESPIRATION RATE: 16 BRPM | BODY MASS INDEX: 29.88 KG/M2 | HEIGHT: 64 IN | OXYGEN SATURATION: 98 % | WEIGHT: 175 LBS | DIASTOLIC BLOOD PRESSURE: 91 MMHG | TEMPERATURE: 97.6 F

## 2023-11-09 LAB
ALBUMIN SERPL ELPH-MCNC: 4.4 G/DL
ALP BLD-CCNC: 81 U/L
ALT SERPL-CCNC: 18 U/L
ANION GAP SERPL CALC-SCNC: 10 MMOL/L
AST SERPL-CCNC: 19 U/L
BILIRUB SERPL-MCNC: 0.3 MG/DL
BUN SERPL-MCNC: 10 MG/DL
C PEPTIDE SERPL-MCNC: 1.7 NG/ML
CALCIUM SERPL-MCNC: 10.1 MG/DL
CHLORIDE SERPL-SCNC: 103 MMOL/L
CHOLEST SERPL-MCNC: 193 MG/DL
CO2 SERPL-SCNC: 26 MMOL/L
CREAT SERPL-MCNC: 0.74 MG/DL
CREAT SPEC-SCNC: 246 MG/DL
EGFR: 104 ML/MIN/1.73M2
ESTIMATED AVERAGE GLUCOSE: 192 MG/DL
GAD65 AB SER-MCNC: 0.01 NMOL/L
GLUCOSE BLDC GLUCOMTR-MCNC: 246
GLUCOSE SERPL-MCNC: 191 MG/DL
HBA1C MFR BLD HPLC: 8.3 %
HDLC SERPL-MCNC: 58 MG/DL
ISLET CELL512 AB SER-SCNC: 0 NMOL/L
LDLC SERPL CALC-MCNC: 108 MG/DL
MICROALBUMIN 24H UR DL<=1MG/L-MCNC: 74.4 MG/DL
MICROALBUMIN/CREAT 24H UR-RTO: 302 MG/G
NONHDLC SERPL-MCNC: 135 MG/DL
POTASSIUM SERPL-SCNC: 4.3 MMOL/L
PROT SERPL-MCNC: 7.4 G/DL
SODIUM SERPL-SCNC: 139 MMOL/L
TRIGL SERPL-MCNC: 154 MG/DL

## 2023-11-09 PROCEDURE — 82962 GLUCOSE BLOOD TEST: CPT

## 2023-11-09 PROCEDURE — 99215 OFFICE O/P EST HI 40 MIN: CPT | Mod: 25

## 2023-11-09 PROCEDURE — 95251 CONT GLUC MNTR ANALYSIS I&R: CPT

## 2023-11-09 RX ORDER — BLOOD SUGAR DIAGNOSTIC
STRIP MISCELLANEOUS
Qty: 3 | Refills: 3 | Status: ACTIVE | COMMUNITY
Start: 2019-04-08 | End: 1900-01-01

## 2023-11-09 RX ORDER — FLUCONAZOLE 150 MG/1
150 TABLET ORAL
Qty: 1 | Refills: 5 | Status: DISCONTINUED | COMMUNITY
Start: 2023-01-11 | End: 2023-11-09

## 2023-11-09 RX ORDER — SEMAGLUTIDE 1.34 MG/ML
4 INJECTION, SOLUTION SUBCUTANEOUS
Qty: 8 | Refills: 2 | Status: DISCONTINUED | COMMUNITY
Start: 2023-08-23 | End: 2023-11-09

## 2023-11-11 LAB — ZINC TRANSPORTER 8 AB: <15 U/ML

## 2023-11-13 NOTE — ED ADULT NURSE NOTE - CAS TRG GEN SKIN CONDITION
"  Gastroenterology Consultation      Date of Admission:  11/10/2023  Reason for Admission: Abdominal pain   Date of Consult  11/13/2023   Requesting Physician:  Malena Fox*           ASSESSMENT AND RECOMMENDATIONS:   Assessment:  21 year old male with a history of idiopathic pancreatis who presented to [facility] ED on 11/10/23 with epigastric abdominal pain. GI consulted for \"recurrent pancreatitis\".      # Recurrent acute idiopathic pancreatitis    # Epigastric abdominal pain, improved  - Patient with recent admission to [facility] in September 2023 for abdominal pain, found to have lipase 1449 consistent with acute pancreatitis of unclear etiology. RUQ US was normal. LFTs were normal. He was treated conservatively and recovered quickly. He was referred to [facility] for genetic testing but has not had the appt yet.   - BISAP score 0   - Labs with lipase 2314 > 2491 >979, LFTs WNL, BMP unremarkable, CBC unremarkable. Triglycerides 100   - CT A/P with evidence of mild changes of acute interstitial pancreatitis   - Patient's pain has significantly improved and he has not required any pain medication. His diet has been advanced and he is tolerating it without issue. Etiology of acute pancreatitis still is unknown. CT scan without evidence of gallstones. Patient reports he does not drink alcohol. Triglycerides mildly elevated and not to level to cause pancreatitis. He is not on medications PTA. No scorpion bites or trauma. Calcium levels WNL. No recent steroids or illness with mumps. Will plan for him to follow up outpatient for further workup/genetic testing.     Recommendations:  - Continue supportive cares while inpatient   - Analgesia/Antiemetics per primary team  - Once patient is tolerating oral diet, he is cleared to discharge from GI perspective    - Outpatient follow up scheduled 1/25/24, will send message to see if patient can be seen sooner though he reported he is in [place] for majority of " "January.     Thank you for involving us in this patient's care. Please do not hesitate to contact the GI service with any questions or concerns.     Overall time spent on the date of this encounter preparing to see the patient (including chart review of available notes, clinical status events, imaging and labs); obtaining and/or reviewing separately obtained history ; ordering medications, tests or procedures; communicating with other health care professionals; and documenting the above clinical information in the electronic medical record was 65 minutes.    Kati Olivo PA-C  GI Service  [facility]  Text Page (Monday-Friday, 8am-4pm)    To page the On-Call [facility] GI provider 24 hours a day, please click [facility] and select GASTROENTEROLOGY MEDICINE ADULT / [facility] in the drop-down menu.   -------------------------------------------------------------------------------------------------------------------       Reason for Consultation:   We were asked by Malena Fox* of medicine to evaluate this patient with \"recurrent pancreatitis\"     History is obtained from the patient and the medical record.            History of Present Illness:     Ade Rodarte is a 21 year old male with a PMH significant for idiopathic pancreatis who presented to [facility] ED on 11/10/23 with epigastric abdominal pain.     Recent admission to [facility] on 9/15/23- 9/16/23 for acute pancreatitis of unknown etiology. He presented with abdominal pain, found to have lipase 1449. RUQ US was normal. LFTs were normal. Per discharge summary : \"Differential etiologies includes EtOH (however, patient denies alcohol use), gallstones (however, alkaline phosphatase wnl and has a normal abdominal ultrasound), hypertriglyceridemia (triglycerides elevated, but not to the level that would account for pancreatitis), medication-induced (on no medications chronically), hypercalcemia (calcium within normal limits w/ normal " "albumin), anatomical (although visualized portion on ultrasound were normal), infections (no localizing symptoms of infection), toxins (no reported toxin exposure), and genetic risk/autoimmune cause.\" He was treated conservatively with IVF and PRN pain medication. He tolerated regular diet early and improved within one day of admission. He was discharged with referral to GI clinic for further evaluation/genetic testing. That appointment is scheduled for January 2024.     He reports that last week Wednesday he ate pizza with his friends and started to have pinpoint epigastric abdominal pain. He then reports increased bloating and discomfort that persisted and worsened until Friday so he came to the ED. He denies associated nausea or vomiting. He reports the pain does not radiate to his back or shoulders. He has daily bowel movements, no diarrhea or black or bloody stools. He denies fevers or chills. No weight loss. He denies alcohol intake, smoking, drug use. He is not on any medications PTA. He denies any recent travel within the past months and no scorpion bites.      In the ED, he was afebrile, initially hypertensive which resolved, not tachycardic or hypoxic. Lab eval showed lipase 2314, unremarkable CMP and CBC. Imaging obtained with CT scan with evidence of mild interstitial pancreatitis. He was given IVF and pain medication and admitted to medicine.     Currently, the patient reports that he is feeling much better. He has been tolerating liquid diet and his diet was advanced this morning. He reports he drinks about 6 cups of water a day. He reports the pain is significantly better. He denies any current nausea or vomiting. Afebrile.       Previous Procedures:  None            Past Medical History:   Reviewed and edited as appropriate  - idiopathic acute pancreatitis            Past Surgical History:   Reviewed and edited as appropriate   No past surgical history on file.           Social History:   Reviewed " and edited as appropriate  Social History     Socioeconomic History    Marital status: Single     Spouse name: Not on file    Number of children: 0    Years of education: Not on file    Highest education level: Not on file   Occupational History    Occupation: child   Tobacco Use    Smoking status: Never    Smokeless tobacco: Never   Substance and Sexual Activity    Alcohol use: Never    Drug use: Never    Sexual activity: Not on file   Other Topics Concern    Not on file   Social History Narrative    Not on file     Social Determinants of Health     Financial Resource Strain: Not on file   Food Insecurity: Not on file   Transportation Needs: Not on file   Physical Activity: Not on file   Stress: Not on file   Social Connections: Not on file   Interpersonal Safety: Not on file   Housing Stability: Not on file              Family History:   Patient's family history is reviewed today and is non-contributory         Allergies:   Reviewed and edited as appropriate   No Known Allergies         Medications:     Medications Prior to Admission   Medication Sig Dispense Refill Last Dose    multivitamin, therapeutic (THERA-VIT) TABS tablet Take 1 tablet by mouth daily                Review of Systems:     A complete review of systems was performed and is negative except as noted in the HPI             Physical Exam:   Temp: 97.9  F (36.6  C) Temp src: Oral BP: (!) 134/91 Pulse: 84   Resp: 18 SpO2: 97 % O2 Device: None (Room air)    Wt:   Wt Readings from Last 2 Encounters:   11/11/23 118.3 kg (260 lb 11.2 oz) (>99%, Z= 2.55)*   09/27/23 118.4 kg (261 lb) (>99%, Z= 2.57)*     * Growth percentiles are based on CDC (Boys, 2-20 Years) data.        General: 21 year old male in NAD.  Answers appropriately.    HEENT: Head is AT/NC. Sclera anicteric. No conjunctival injection.  Oropharynx is clear, moist   Neck: Supple  Lungs: Non labored breathing on room air   Heart: Regular rate   Abdomen: Soft, non-tender, non-distended. No  rebound or peritoneal signs  Extremities: No pedal edema.  Skin: No jaundice or rash  Neurologic: Grossly non-focal.              Data:   Labs and imaging below were independently reviewed and interpreted    LAB WORK:    BMP  Recent Labs   Lab 11/12/23  0911 11/11/23  0800 11/10/23  1855 11/10/23  1803    139 139 138   POTASSIUM 4.1 4.4 3.9 4.8   CHLORIDE 102 105 103 103   MELANIA 9.7 9.2 9.4 9.5   CO2 21* 25 25 24   BUN 8.1 7.6 9.9 10.3   CR 0.76 0.79 0.76 0.73   GLC 86 104* 116* 97     CBC  Recent Labs   Lab 11/12/23  0911 11/11/23  0800 11/10/23  1803   WBC 5.6 5.9 7.6   RBC  --  4.88 5.22   HGB  --  13.8 14.8   HCT  --  42.5 45.4   MCV  --  87 87   MCH  --  28.3 28.4   MCHC  --  32.5 32.6   RDW  --  13.3 13.4   PLT  --  222 196     INRNo lab results found in last 7 days.  LFTs  Recent Labs   Lab 11/12/23  0911 11/11/23  0800 11/10/23  1855 11/10/23  1803   ALKPHOS 65 56 61  --    AST 18 19 18  --    ALT 27 26 30  --    BILITOTAL 0.5 0.4 0.3 0.2   PROTTOTAL 7.5 6.8 7.4 7.7   ALBUMIN 4.4 4.2 4.5 4.5      PANC  Recent Labs   Lab 11/12/23  0911 11/11/23  0800 11/10/23  1803   LIPASE 979* 2,491* 2,314*     CULTURES:   7-Day Micro Results       No results found for the last 168 hours.             IMAGING:  CT A/P on 11/10/23  IMPRESSION:   1.  Mild changes of acute interstitial pancreatitis.        =======================================================================   Dry/Warm

## 2023-12-29 ENCOUNTER — RX RENEWAL (OUTPATIENT)
Age: 43
End: 2023-12-29

## 2024-01-24 ENCOUNTER — APPOINTMENT (OUTPATIENT)
Dept: ENDOCRINOLOGY | Facility: CLINIC | Age: 44
End: 2024-01-24
Payer: COMMERCIAL

## 2024-01-24 PROCEDURE — 99215 OFFICE O/P EST HI 40 MIN: CPT | Mod: 25,95

## 2024-01-24 RX ORDER — INSULIN GLARGINE-YFGN 100 [IU]/ML
100 INJECTION, SOLUTION SUBCUTANEOUS
Qty: 6 | Refills: 2 | Status: DISCONTINUED | COMMUNITY
Start: 2022-11-18 | End: 2024-01-24

## 2024-01-24 RX ORDER — IRBESARTAN AND HYDROCHLOROTHIAZIDE 150; 12.5 MG/1; MG/1
150-12.5 TABLET ORAL DAILY
Qty: 90 | Refills: 3 | Status: ACTIVE | COMMUNITY
Start: 2023-01-11 | End: 1900-01-01

## 2024-01-24 NOTE — ASSESSMENT
[FreeTextEntry1] : Most recent HBA1C has improved to 8.6% however still above goal, she was told initially that she had type 1 diabetes that was told that she has type 2 diabetes. Reports better compliance, but still does not take basal and premeal twice-thrice a week. Using Dexcom CGM the CGM data shows that all her blood sugars are greater than 250, however AGP has improved from past to time in target to >15% from <1% She does not want to increase metformin because it causes symptoms of fatigue. Advised to increase the ozempic  PLAN: - Increase Metformin  mg  BID -Increase Semglee 44 units in the morning  Discussed that even if she is eating a small carb meal, she should inject low dose of Humalog ( 3 units) regardless of her blood sugars at the time of meals merna in the morning as her BS are high in 200 around afternoon. - Continue 2 mg Ozempic weekly. Confirmed no history of pancreatitis or personal or family history of medullary thyroid cancer. -Has worsening microalbuminuria, currently on irbesartan, however non-compliant, Counselled on compliance - Has elevated LDL, but not interested in starting atorvastatin. Advised to start low fat diet - Advised to follow up with ophthalmology and podiatry - Medication compliance re-emphasized. - Labs before the next visit.

## 2024-01-24 NOTE — REVIEW OF SYSTEMS
[As Noted in HPI] : as noted in HPI [Recent Weight Gain (___ Lbs)] : recent weight gain: [unfilled] lbs [Pain/Numbness of Digits] : pain/numbness of digits [All other systems negative] : All other systems negative [Fatigue] : no fatigue [Recent Weight Loss (___ Lbs)] : no recent weight loss [Blurred Vision] : no blurred vision [Neck Pain] : no neck pain [Chest Pain] : no chest pain [Palpitations] : no palpitations [Lower Ext Edema] : no lower extremity edema [Shortness Of Breath] : no shortness of breath [Cough] : no cough [SOB on Exertion] : no shortness of breath on exertion [Nausea] : no nausea [Constipation] : no constipation [Abdominal Pain] : no abdominal pain [Vomiting] : no vomiting [Diarrhea] : no diarrhea [Polyuria] : no polyuria [Joint Pain] : no joint pain [Muscle Weakness] : no muscle weakness [Headaches] : no headaches [Tremors] : no tremors

## 2024-01-24 NOTE — HISTORY OF PRESENT ILLNESS
[FreeTextEntry1] :  This visit was provided via telehealth using real-time 2-way audio visual technology. The patient was located at home at the time of the visit. The provider, DIPESH ANTHONY, was located at the medical office located in [address] [place] [address] at the time of the visit. The patient and Provider participated in the telehealth encounter. Verbal consent given by the patient, self.  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 At age of 22 years re-diagnosed as type 2 Diabetes?  Medication regimen: Semglee 40 units morning/afternoon, Metformin  mg daily, Humalog 6-8 units with meals (reduced from the past). Ozempic 2 mg weekly, patient is now reports better compliance with insulin, but she still misses Humalog few days of the weeks due to busy job schedule. She has restarted eat white or brown rice and has been noticing hyperglycemia.  Use a Dexcom G6 CGM: AGP profile shows Time in target range: 64% Time above target: 36% Time below target: Less than 0% Hypoglycemic events: Denies.  Diet: She works at nights, has odd eating times Has now changed her diet. has started eating low carbohydrate diet Does Brunch most of the time Lunch: Skips lunch Dinner: sometimes eat nuts, brown and white rice, chicken Ophthalmology appointment: Last year, Denies diabetic retinopathy, has not made an appointment yet Peripheral Neuropathy: Feels tingling during the winter season, has seen Podiatrist. CVD: Denies Infections: Has in the past Yeast infections, no recently Vaccinations: Yes Covid vaccines, Flu vaccines

## 2024-01-25 LAB
ALBUMIN SERPL ELPH-MCNC: 4.2 G/DL
ALP BLD-CCNC: 72 U/L
ALT SERPL-CCNC: 22 U/L
ANION GAP SERPL CALC-SCNC: 9 MMOL/L
AST SERPL-CCNC: 20 U/L
BILIRUB SERPL-MCNC: 0.3 MG/DL
BUN SERPL-MCNC: 14 MG/DL
CALCIUM SERPL-MCNC: 10.1 MG/DL
CHLORIDE SERPL-SCNC: 103 MMOL/L
CHOLEST SERPL-MCNC: 157 MG/DL
CO2 SERPL-SCNC: 24 MMOL/L
CREAT SERPL-MCNC: 0.81 MG/DL
CREAT SPEC-SCNC: 247 MG/DL
EGFR: 93 ML/MIN/1.73M2
ESTIMATED AVERAGE GLUCOSE: 140 MG/DL
GLUCOSE SERPL-MCNC: 145 MG/DL
HBA1C MFR BLD HPLC: 6.5 %
HDLC SERPL-MCNC: 51 MG/DL
LDLC SERPL CALC-MCNC: 85 MG/DL
MICROALBUMIN 24H UR DL<=1MG/L-MCNC: 81.7 MG/DL
MICROALBUMIN/CREAT 24H UR-RTO: 331 MG/G
NONHDLC SERPL-MCNC: 105 MG/DL
POTASSIUM SERPL-SCNC: 4.5 MMOL/L
PROT SERPL-MCNC: 7.6 G/DL
SODIUM SERPL-SCNC: 137 MMOL/L
TRIGL SERPL-MCNC: 111 MG/DL

## 2024-03-25 ENCOUNTER — RX RENEWAL (OUTPATIENT)
Age: 44
End: 2024-03-25

## 2024-03-27 RX ORDER — BLOOD-GLUCOSE TRANSMITTER
EACH MISCELLANEOUS
Qty: 1 | Refills: 0 | Status: ACTIVE | COMMUNITY
Start: 2023-01-12

## 2024-04-24 ENCOUNTER — APPOINTMENT (OUTPATIENT)
Dept: ENDOCRINOLOGY | Facility: CLINIC | Age: 44
End: 2024-04-24
Payer: COMMERCIAL

## 2024-04-24 DIAGNOSIS — E11.65 TYPE 2 DIABETES MELLITUS WITH HYPERGLYCEMIA: ICD-10-CM

## 2024-04-24 DIAGNOSIS — Z97.8 PRESENCE OF OTHER SPECIFIED DEVICES: ICD-10-CM

## 2024-04-24 DIAGNOSIS — E78.5 HYPERLIPIDEMIA, UNSPECIFIED: ICD-10-CM

## 2024-04-24 DIAGNOSIS — R80.9 PROTEINURIA, UNSPECIFIED: ICD-10-CM

## 2024-04-24 LAB
ALBUMIN SERPL ELPH-MCNC: 4.5 G/DL
ALP BLD-CCNC: 86 U/L
ALT SERPL-CCNC: 18 U/L
ANION GAP SERPL CALC-SCNC: 10 MMOL/L
AST SERPL-CCNC: 19 U/L
BILIRUB SERPL-MCNC: 0.3 MG/DL
BUN SERPL-MCNC: 9 MG/DL
CALCIUM SERPL-MCNC: 10.4 MG/DL
CHLORIDE SERPL-SCNC: 104 MMOL/L
CHOLEST SERPL-MCNC: 192 MG/DL
CO2 SERPL-SCNC: 27 MMOL/L
CREAT SERPL-MCNC: 0.94 MG/DL
EGFR: 78 ML/MIN/1.73M2
ESTIMATED AVERAGE GLUCOSE: 137 MG/DL
GLUCOSE SERPL-MCNC: 118 MG/DL
HBA1C MFR BLD HPLC: 6.4 %
HDLC SERPL-MCNC: 53 MG/DL
LDLC SERPL CALC-MCNC: 101 MG/DL
NONHDLC SERPL-MCNC: 138 MG/DL
POTASSIUM SERPL-SCNC: 4.9 MMOL/L
PROT SERPL-MCNC: 7.6 G/DL
SODIUM SERPL-SCNC: 140 MMOL/L
TRIGL SERPL-MCNC: 216 MG/DL

## 2024-04-24 PROCEDURE — 99215 OFFICE O/P EST HI 40 MIN: CPT | Mod: 25

## 2024-04-24 RX ORDER — INSULIN GLARGINE-YFGN 100 [IU]/ML
100 INJECTION, SOLUTION SUBCUTANEOUS
Qty: 7 | Refills: 0 | Status: ACTIVE | COMMUNITY
Start: 2022-07-12 | End: 1900-01-01

## 2024-04-24 RX ORDER — BLOOD-GLUCOSE SENSOR
EACH MISCELLANEOUS
Qty: 3 | Refills: 0 | Status: ACTIVE | COMMUNITY
Start: 2023-01-12 | End: 1900-01-01

## 2024-04-25 PROBLEM — Z97.8 USES SELF-APPLIED CONTINUOUS GLUCOSE MONITORING DEVICE: Status: ACTIVE | Noted: 2023-08-23

## 2024-04-25 PROBLEM — R80.9 MICROALBUMINURIA: Status: ACTIVE | Noted: 2023-08-23

## 2024-04-25 PROBLEM — E78.5 HYPERLIPIDEMIA: Status: ACTIVE | Noted: 2023-08-23

## 2024-04-25 NOTE — ASSESSMENT
[FreeTextEntry1] : Most recent HBA1C has improved to 6.4% now at goal, she was told initially that she had type 1 diabetes that was told that she has type 2 diabetes. Reports better compliance, now not needing humalog after Ozempic dose was increased Using Dexcom CGM the CGM data shows time in target range of 71%   PLAN: -  continue Metformin  mg daily -Reduce Semglee 25 units in the morning - Continue 2 mg Ozempic weekly. Confirmed no history of pancreatitis or personal or family history of medullary thyroid cancer. -Has worsening microalbuminuria, currently on irbesartan, however non-compliant, Counselled on compliance - Has elevated LDL, but not interested in starting atorvastatin. Advised to start low fat diet - Advised to follow up with ophthalmology and podiatry - Medication compliance re-emphasized. - Labs before the next visit.  RTC in 3 months

## 2024-04-25 NOTE — HISTORY OF PRESENT ILLNESS
[FreeTextEntry1] :  This visit was provided via telehealth using real-time 2-way audio visual technology. The patient was located at home at the time of the visit. The provider, DIPESH ANTHONY, was located at the medical office located in [address] [place] [address] at the time of the visit. The patient and Provider participated in the telehealth encounter. Verbal consent given by the patient, self.  43-year-old Female with PMH of type 2 diabetes, uterine leiomyoma came for diabetes follow up  Diabetes Hx: Initially diagnosed with diabetes at age of 13 years, type 1 ruled out as MANJINDER-65 antibody level was negative. At age of 22 years re-diagnosed as type 2 Diabetes?  Medication regimen: Semglee 40 units morning/afternoon, Metformin  mg daily, Humalog 6 units with meals as needed when eats high carb  (reduced from the past). Ozempic 2 mg weekly,   Use a Dexcom G6 CGM: AGP profile shows 14 day data shows Time in target range: 71% Time above target: 27% Time below target: Less than 0% Reports BS are better after starting ozempic Hypoglycemic events: Denies. Diet: has started eating low carbohydrate diet Does Brunch most of the time Lunch: Skips lunch Dinner: sometimes eat nuts, brown and white rice, chicken Ophthalmology appointment: Has not made it yet Peripheral Neuropathy: Feels tingling during the winter season, has seen Podiatrist. CVD: Denies Infections: Has in the past Yeast infections, no recently Vaccinations: Yes Covid vaccines, Flu vaccines

## 2024-05-13 RX ORDER — SEMAGLUTIDE 2.68 MG/ML
8 INJECTION, SOLUTION SUBCUTANEOUS
Qty: 3 | Refills: 1 | Status: ACTIVE | COMMUNITY
Start: 2023-04-18 | End: 1900-01-01

## 2024-06-25 ENCOUNTER — TRANSCRIPTION ENCOUNTER (OUTPATIENT)
Age: 44
End: 2024-06-25

## 2024-06-26 ENCOUNTER — TRANSCRIPTION ENCOUNTER (OUTPATIENT)
Age: 44
End: 2024-06-26

## 2024-06-26 ENCOUNTER — NON-APPOINTMENT (OUTPATIENT)
Age: 44
End: 2024-06-26

## 2024-06-27 ENCOUNTER — TRANSCRIPTION ENCOUNTER (OUTPATIENT)
Age: 44
End: 2024-06-27

## 2024-07-05 ENCOUNTER — NON-APPOINTMENT (OUTPATIENT)
Age: 44
End: 2024-07-05

## 2024-07-05 ENCOUNTER — APPOINTMENT (OUTPATIENT)
Dept: FAMILY MEDICINE | Facility: CLINIC | Age: 44
End: 2024-07-05
Payer: COMMERCIAL

## 2024-07-05 VITALS
WEIGHT: 178 LBS | HEIGHT: 64 IN | SYSTOLIC BLOOD PRESSURE: 130 MMHG | RESPIRATION RATE: 16 BRPM | OXYGEN SATURATION: 100 % | HEART RATE: 100 BPM | TEMPERATURE: 97.6 F | DIASTOLIC BLOOD PRESSURE: 76 MMHG | BODY MASS INDEX: 30.39 KG/M2

## 2024-07-05 VITALS — HEART RATE: 96 BPM

## 2024-07-05 DIAGNOSIS — E66.9 OBESITY, UNSPECIFIED: ICD-10-CM

## 2024-07-05 DIAGNOSIS — I10 ESSENTIAL (PRIMARY) HYPERTENSION: ICD-10-CM

## 2024-07-05 DIAGNOSIS — E11.9 TYPE 2 DIABETES MELLITUS W/OUT COMPLICATIONS: ICD-10-CM

## 2024-07-05 DIAGNOSIS — Z82.49 FAMILY HISTORY OF ISCHEMIC HEART DISEASE AND OTHER DISEASES OF THE CIRCULATORY SYSTEM: ICD-10-CM

## 2024-07-05 DIAGNOSIS — R80.9 PROTEINURIA, UNSPECIFIED: ICD-10-CM

## 2024-07-05 DIAGNOSIS — Z00.00 ENCOUNTER FOR GENERAL ADULT MEDICAL EXAMINATION W/OUT ABNORMAL FINDINGS: ICD-10-CM

## 2024-07-05 DIAGNOSIS — E78.5 HYPERLIPIDEMIA, UNSPECIFIED: ICD-10-CM

## 2024-07-05 PROCEDURE — 99213 OFFICE O/P EST LOW 20 MIN: CPT | Mod: 25

## 2024-07-05 PROCEDURE — 93000 ELECTROCARDIOGRAM COMPLETE: CPT | Mod: 59

## 2024-07-05 PROCEDURE — G0444 DEPRESSION SCREEN ANNUAL: CPT | Mod: 59

## 2024-07-05 PROCEDURE — 99396 PREV VISIT EST AGE 40-64: CPT

## 2024-07-05 PROCEDURE — 99386 PREV VISIT NEW AGE 40-64: CPT

## 2024-07-12 ENCOUNTER — TRANSCRIPTION ENCOUNTER (OUTPATIENT)
Age: 44
End: 2024-07-12

## 2024-07-13 ENCOUNTER — RX RENEWAL (OUTPATIENT)
Age: 44
End: 2024-07-13

## 2024-07-15 LAB
25(OH)D3 SERPL-MCNC: 21.3 NG/ML
ALBUMIN SERPL ELPH-MCNC: 4.4 G/DL
ALP BLD-CCNC: 83 U/L
ALT SERPL-CCNC: 18 U/L
ANION GAP SERPL CALC-SCNC: 12 MMOL/L
APO LP(A) SERPL-MCNC: 89.8 NMOL/L
AST SERPL-CCNC: 17 U/L
BASOPHILS # BLD AUTO: 0.03 K/UL
BASOPHILS NFR BLD AUTO: 0.3 %
BILIRUB SERPL-MCNC: 0.3 MG/DL
BUN SERPL-MCNC: 13 MG/DL
CALCIUM SERPL-MCNC: 10.1 MG/DL
CHLORIDE SERPL-SCNC: 99 MMOL/L
CHOLEST SERPL-MCNC: 195 MG/DL
CO2 SERPL-SCNC: 22 MMOL/L
CREAT SERPL-MCNC: 0.9 MG/DL
EGFR: 82 ML/MIN/1.73M2
EOSINOPHIL # BLD AUTO: 0.08 K/UL
EOSINOPHIL NFR BLD AUTO: 0.8 %
ESTIMATED AVERAGE GLUCOSE: 169 MG/DL
FOLATE SERPL-MCNC: 9.9 NG/ML
GLUCOSE SERPL-MCNC: 185 MG/DL
HBA1C MFR BLD HPLC: 7.5 %
HCT VFR BLD CALC: 39.1 %
HDLC SERPL-MCNC: 50 MG/DL
HGB BLD-MCNC: 13.2 G/DL
IMM GRANULOCYTES NFR BLD AUTO: 0.5 %
LDLC SERPL CALC-MCNC: 114 MG/DL
LYMPHOCYTES # BLD AUTO: 2.61 K/UL
LYMPHOCYTES NFR BLD AUTO: 24.9 %
MAGNESIUM SERPL-MCNC: 1.9 MG/DL
MAN DIFF?: NORMAL
MCHC RBC-ENTMCNC: 31 PG
MCHC RBC-ENTMCNC: 33.8 GM/DL
MCV RBC AUTO: 91.8 FL
MONOCYTES # BLD AUTO: 0.61 K/UL
MONOCYTES NFR BLD AUTO: 5.8 %
NEUTROPHILS # BLD AUTO: 7.09 K/UL
NEUTROPHILS NFR BLD AUTO: 67.7 %
NONHDLC SERPL-MCNC: 145 MG/DL
PLATELET # BLD AUTO: 396 K/UL
POTASSIUM SERPL-SCNC: 5 MMOL/L
PROT SERPL-MCNC: 7.4 G/DL
RBC # BLD: 4.26 M/UL
RBC # FLD: 13.6 %
SODIUM SERPL-SCNC: 133 MMOL/L
TRIGL SERPL-MCNC: 180 MG/DL
TSH SERPL-ACNC: 1.75 UIU/ML
VIT B12 SERPL-MCNC: 976 PG/ML
WBC # FLD AUTO: 10.47 K/UL

## 2024-07-16 ENCOUNTER — TRANSCRIPTION ENCOUNTER (OUTPATIENT)
Age: 44
End: 2024-07-16

## 2024-07-26 ENCOUNTER — TRANSCRIPTION ENCOUNTER (OUTPATIENT)
Age: 44
End: 2024-07-26

## 2024-07-26 DIAGNOSIS — E78.41 ELEVATED LIPOPROTEIN(A): ICD-10-CM

## 2025-01-13 ENCOUNTER — RX RENEWAL (OUTPATIENT)
Age: 45
End: 2025-01-13

## 2025-01-13 RX ORDER — BLOOD-GLUCOSE SENSOR
EACH MISCELLANEOUS
Qty: 5 | Refills: 0 | Status: ACTIVE | COMMUNITY
Start: 2025-01-13 | End: 1900-01-01